# Patient Record
Sex: MALE | Race: BLACK OR AFRICAN AMERICAN
[De-identification: names, ages, dates, MRNs, and addresses within clinical notes are randomized per-mention and may not be internally consistent; named-entity substitution may affect disease eponyms.]

---

## 2021-11-23 ENCOUNTER — HOSPITAL ENCOUNTER (INPATIENT)
Dept: HOSPITAL 54 - ER | Age: 71
LOS: 2 days | Discharge: NURSING, REHAB OR LONG-TERM CARE | DRG: 202 | End: 2021-11-25
Attending: INTERNAL MEDICINE | Admitting: INTERNAL MEDICINE
Payer: MEDICARE

## 2021-11-23 VITALS — HEIGHT: 72 IN | WEIGHT: 163.31 LBS | BODY MASS INDEX: 22.12 KG/M2

## 2021-11-23 VITALS — DIASTOLIC BLOOD PRESSURE: 88 MMHG | SYSTOLIC BLOOD PRESSURE: 152 MMHG

## 2021-11-23 VITALS — DIASTOLIC BLOOD PRESSURE: 98 MMHG | SYSTOLIC BLOOD PRESSURE: 157 MMHG

## 2021-11-23 DIAGNOSIS — Z20.822: ICD-10-CM

## 2021-11-23 DIAGNOSIS — J40: Primary | ICD-10-CM

## 2021-11-23 DIAGNOSIS — D64.9: ICD-10-CM

## 2021-11-23 DIAGNOSIS — F20.9: ICD-10-CM

## 2021-11-23 DIAGNOSIS — E11.9: ICD-10-CM

## 2021-11-23 DIAGNOSIS — N17.0: ICD-10-CM

## 2021-11-23 DIAGNOSIS — E85.9: ICD-10-CM

## 2021-11-23 DIAGNOSIS — I10: ICD-10-CM

## 2021-11-23 DIAGNOSIS — I50.32: ICD-10-CM

## 2021-11-23 DIAGNOSIS — I11.0: ICD-10-CM

## 2021-11-23 DIAGNOSIS — Z87.891: ICD-10-CM

## 2021-11-23 DIAGNOSIS — I25.10: ICD-10-CM

## 2021-11-23 LAB
BASOPHILS # BLD AUTO: 0 K/UL (ref 0–0.2)
BASOPHILS NFR BLD AUTO: 0.6 % (ref 0–2)
BUN SERPL-MCNC: 22 MG/DL (ref 7–18)
CALCIUM SERPL-MCNC: 9.3 MG/DL (ref 8.5–10.1)
CHLORIDE SERPL-SCNC: 105 MMOL/L (ref 98–107)
CO2 SERPL-SCNC: 32 MMOL/L (ref 21–32)
CREAT SERPL-MCNC: 1.4 MG/DL (ref 0.6–1.3)
EOSINOPHIL NFR BLD AUTO: 2.2 % (ref 0–6)
GLUCOSE SERPL-MCNC: 116 MG/DL (ref 74–106)
HCT VFR BLD AUTO: 40 % (ref 39–51)
HGB BLD-MCNC: 12.8 G/DL (ref 13.5–17.5)
LYMPHOCYTES NFR BLD AUTO: 1.7 K/UL (ref 0.8–4.8)
LYMPHOCYTES NFR BLD AUTO: 38.6 % (ref 20–44)
MCHC RBC AUTO-ENTMCNC: 32 G/DL (ref 31–36)
MCV RBC AUTO: 82 FL (ref 80–96)
MONOCYTES NFR BLD AUTO: 0.3 K/UL (ref 0.1–1.3)
MONOCYTES NFR BLD AUTO: 7 % (ref 2–12)
NEUTROPHILS # BLD AUTO: 2.3 K/UL (ref 1.8–8.9)
NEUTROPHILS NFR BLD AUTO: 51.6 % (ref 43–81)
PLATELET # BLD AUTO: 218 K/UL (ref 150–450)
POTASSIUM SERPL-SCNC: 4 MMOL/L (ref 3.5–5.1)
RBC # BLD AUTO: 4.84 MIL/UL (ref 4.5–6)
SODIUM SERPL-SCNC: 141 MMOL/L (ref 136–145)
WBC NRBC COR # BLD AUTO: 4.4 K/UL (ref 4.3–11)

## 2021-11-23 PROCEDURE — C9803 HOPD COVID-19 SPEC COLLECT: HCPCS

## 2021-11-23 PROCEDURE — G0378 HOSPITAL OBSERVATION PER HR: HCPCS

## 2021-11-23 RX ADMIN — ZOLPIDEM TARTRATE PRN MG: 5 TABLET, FILM COATED ORAL at 21:42

## 2021-11-23 NOTE — NUR
TELE RN NOTE





RECEIVED PT A/O X2 ON ROOM AIR WITH NO SIGNS OF RESP/ DISTRESS. PT HAS A RIGHT AC 18G SL 
FLUSHED PATENT AND INTACT.  PT PLACED ON TELE MONITOR, BELONGINGS DOCUMENTED IN THE CHART. 
PT. ABLE TO USE URINAL, VOIDED 150 ML. VITAL SIGNS AS FOLLOWS, TEMP; 98.1, RESP RATE; 20, 02 
SAT 97%, HR 72, /98.   PT ORIENTED TO ROOM AND HOW TO USE CALL LIGHT.  SAFETY MEASURES 
IN PLACE, BED IN LOWEST LOCKED POSITION. CALL LIGHT WITHIN REACH. SIDE RAILS UP X 2.

## 2021-11-23 NOTE — NUR
TELE RN CLOSING NOTES



PT. IS RESTING IN BED WITH HOB IN SEMI FOWLERS POSITION ON ROOM AIR WITH SOME COUGHING.  PT 
HAS A R AC 18 G SL.  PT. IS REQUESTING TO SEE THE DOCTOR, PT IS ASKING FOR SLEEPING PILLS, 
WILL ENDORSE TO NEXT SHIFT.  PT. IS AMBULATORY AND ABLE TO MAKE NEEDS KNOWN.  SAFETY 
MEASURES IN PLACE, BED IN LOWEST LOCKED POSITION WITH CALL LIGHT IN REACH.  SIDE RAILS UP X 
2.

## 2021-11-23 NOTE — NUR
RN OPENING NOTES

RECEIVED PT A/O X3, PT ON ROOM AIR WITH NO SIGNS OF RESPIRATORY DISTRESS AND O2 SAT OF 98%. 
PT IS ABLE TO VERBALIZE NEEDS, HAS NO COMPLAINS OF CHEST PAIN AT THIS TIME. PT HAS IV ACCESS 
ON RIGHT AC 18G SL FLUSHED PATENT AND INTACT.  PT PLACED ON TELE MONITOR, NSR WITH 
OCCASIONAL PVCs AND HR OF 84. SKIN IS INTACT. VITAL SIGNS AS FOLLOWS, TEMP 98.6, RESP RATE; 
20, /88. ALL SAFETY MEASURES ARE IMPLEMENTED, CALL LIGHT IS WITHIN REACH, BED IS AT 
LOWEST POSITION WITH WHEELS LOCKED IN PLACE, SIDE RAILS UP X2. WILL CONTINUE TO MONITOR FOR 
ANY CHANGES IN PATIENT HEALTH STATUS.

## 2021-11-23 NOTE — NUR
Facility called 911; c/o CP, on and off for 4 weeks; high blood pressure. PT 
AAOX3, RR EVEN & UNLABORED. DENIES DIZZINESS, N/V, WEAKNESS AT THIS TIME. PT 
SEEN & EVAL'D BY DR. PORTER. PLACED ON CARDIAC MONITOR, SR. O2 SAT 96%RA. WILL 
CONT TO MONITOR.

## 2021-11-24 VITALS — DIASTOLIC BLOOD PRESSURE: 90 MMHG | SYSTOLIC BLOOD PRESSURE: 158 MMHG

## 2021-11-24 VITALS — SYSTOLIC BLOOD PRESSURE: 139 MMHG | DIASTOLIC BLOOD PRESSURE: 79 MMHG

## 2021-11-24 VITALS — SYSTOLIC BLOOD PRESSURE: 109 MMHG | DIASTOLIC BLOOD PRESSURE: 85 MMHG

## 2021-11-24 VITALS — DIASTOLIC BLOOD PRESSURE: 87 MMHG | SYSTOLIC BLOOD PRESSURE: 147 MMHG

## 2021-11-24 VITALS — SYSTOLIC BLOOD PRESSURE: 142 MMHG | DIASTOLIC BLOOD PRESSURE: 79 MMHG

## 2021-11-24 VITALS — SYSTOLIC BLOOD PRESSURE: 146 MMHG | DIASTOLIC BLOOD PRESSURE: 90 MMHG

## 2021-11-24 RX ADMIN — Medication PRN MG: at 14:15

## 2021-11-24 RX ADMIN — ZOLPIDEM TARTRATE PRN MG: 5 TABLET, FILM COATED ORAL at 21:10

## 2021-11-24 RX ADMIN — PANTOPRAZOLE SODIUM SCH MG: 40 TABLET, DELAYED RELEASE ORAL at 08:08

## 2021-11-24 RX ADMIN — HALOPERIDOL SCH MG: 5 TABLET ORAL at 21:09

## 2021-11-24 RX ADMIN — FUROSEMIDE SCH MG: 10 INJECTION, SOLUTION INTRAMUSCULAR; INTRAVENOUS at 10:48

## 2021-11-24 RX ADMIN — ASPIRIN 81 MG SCH MG: 81 TABLET ORAL at 08:08

## 2021-11-24 RX ADMIN — Medication PRN MG: at 16:20

## 2021-11-24 RX ADMIN — FUROSEMIDE SCH MG: 10 INJECTION, SOLUTION INTRAMUSCULAR; INTRAVENOUS at 14:55

## 2021-11-24 NOTE — NUR
RN CLOSING NOTES

WILL ENDORSE PT TO DAY SHIT NURSE IN STABLE CONDITIONS, A/O X3, PT ON ROOM AIR WITH NO SIGNS 
OF RESPIRATORY DISTRESS AND O2 SAT %. PT IS ABLE TO VERBALIZE NEEDS, HAS NO COMPLAINS 
OF CHEST PAIN AT THIS TIME. PT HAS IV ACCESS ON RIGHT AC 18G SL FLUSHED PATENT AND INTACT.  
PT PLACED ON TELE MONITOR, SINUS BRADYCARDIA HR 53 WITH OCCASIONAL PVCs. PT REMAINS 
ASYMPTOMATIC. SKIN IS INTACT.  ALL SAFETY MEASURES ARE IMPLEMENTED, CALL LIGHT IS WITHIN 
REACH, BED IS AT LOWEST POSITION WITH WHEELS LOCKED IN PLACE, SIDE RAILS UP X2. WILL ENDORSE 
TO DAY SHIFT NURSE FOR CLAUDIO.

## 2021-11-24 NOTE — NUR
Report given to MICHELLE Hill for CLAUDIO. Transported back to rm 101-1 via [facility], remains in stable 
condition at this time.

## 2021-11-24 NOTE — NUR
TELE RN OPENING NOTE 



RECEIVED PT A/O X3, PT ON ROOM AIR WITH NO SIGNS OF RESPIRATORY DISTRESS AND O2 SAT OF 98%. 
PT IS ABLE TO VERBALIZE NEEDS, HAS NO COMPLAINS OF CHEST PAIN AT THIS TIME. PT HAS IV ACCESS 
ON RIGHT AC 18G SL PATENT AND INTACT. SKIN INTACT PT PLACED ON TELE MONITOR. ALL SAFETY 
MEASURES ARE IMPLEMENTED, CALL LIGHT IS WITHIN REACH, BED IS AT LOWEST POSITION WITH WHEELS 
LOCKED IN PLACE, SIDE RAILS UP X2.

## 2021-11-24 NOTE — NUR
TELE RN CLOSING NOTE 





 PATIENT IN STABLE CONDITIONS, A/O X3, PT ON ROOM AIR WITH NO SIGNS OF RESPIRATORY DISTRESS 
AND O2 SAT %. PT IS ABLE TO VERBALIZE NEEDS, HAS NO COMPLAINS OF CHEST PAIN AT THIS 
TIME. PT HAS IV ACCESS ON RIGHT AC 18G SL FLUSHED PATENT AND INTACT.  PT PLACED ON TELE 
MONITOR, NSR WITH HR OF 77 BPM. SKIN IS INTACT.  ALL SAFETY MEASURES ARE IMPLEMENTED, CALL 
LIGHT IS WITHIN REACH, BED IS AT LOWEST POSITION WITH WHEELS LOCKED IN PLACE, SIDE RAILS UP 
X2. WILL ENDORSE TO NIGHT NURSE FOR CLAUDIO.

## 2021-11-24 NOTE — NUR
TELE-1/RN



PT REFUSING BLOOD DRAW. RISK AND BENEFITS EXPLAINED. PT STATES HE "JUST WANTS TO REST". WILL 
CONTINUE TO MONITOR.

## 2021-11-25 VITALS — SYSTOLIC BLOOD PRESSURE: 93 MMHG | DIASTOLIC BLOOD PRESSURE: 62 MMHG

## 2021-11-25 VITALS — SYSTOLIC BLOOD PRESSURE: 100 MMHG | DIASTOLIC BLOOD PRESSURE: 76 MMHG

## 2021-11-25 VITALS — DIASTOLIC BLOOD PRESSURE: 104 MMHG | SYSTOLIC BLOOD PRESSURE: 160 MMHG

## 2021-11-25 VITALS — DIASTOLIC BLOOD PRESSURE: 78 MMHG | SYSTOLIC BLOOD PRESSURE: 126 MMHG

## 2021-11-25 RX ADMIN — ASPIRIN 81 MG SCH MG: 81 TABLET ORAL at 08:12

## 2021-11-25 RX ADMIN — HALOPERIDOL SCH MG: 5 TABLET ORAL at 08:12

## 2021-11-25 RX ADMIN — PANTOPRAZOLE SODIUM SCH MG: 40 TABLET, DELAYED RELEASE ORAL at 08:12

## 2021-11-25 NOTE — NUR
RN NOTES



 2 EMT'S FROM [facility] CAME TO PICK-UP PT  TO BE TRANSFERRED TO [facility]  BUT PT'S BP 
/104MMHG. THEY STATED THAT THEY CAN NOT TAKE PT BECAUSE [facility] WILL NOT 
DEFINITELY ACCEPT PT. THEY LEFT UNIT AND SAID TO CALL THEM ONCE BP GOES DOWN AND STABLE. DR HOLGUIN MADE AWARE WITH ORDER TO GIVE HYDRALAZINE 10 MG IV X1 DOSE AND WAS ADMINISTERED. 
WILL RECHECK BP.

## 2021-11-25 NOTE — NUR
RN NOTES



RECHECKED X2 AND /91 /95 POST 15/20 MINUTES POST ADMINISTRATION OF HYDRALAZINE 
10MG IV X1 DOSE. CALLED [facility]  AMBULANCE SERVICE AND SPOKED TO DARYL AND STATED THAT THEIR 
AMBULANCE WILL COME SOON.

## 2021-11-25 NOTE — NUR
RN DISCHARGED NOTES



PT DISCHARGED TO [facility] IN STABLE CONDITION. CALLED AND REPORT GIVEN TO MICHELLE HICKMAN EARLIER AND PT  WILL GO TO RM 26A. PT IS A/O X2-3 WITH SOME CONFUSION AND FORGETFULNESS 
NOTED. ABLE TO MAKE NEEDS KNOWN. PT MADE AWARE  OF BEING TRANSFERRED/DC'D TO [facility] AND 
VERBALIZED UNDERSTANDING. V/S STABLE NOW, LATEST BP TAKEN BY EMT'S /75MMHG, HR 90.  
ALL BELONGING ACCOUNTED FOR AND SIGNED FORM. IV ACCESS ON RAC G#18 REMOVED, NO ACTIVE 
BLEEDING NOTED AND DRY PRESSURE DRESSING APPLIED AT SITE. PT LEFT UNIT  AT 1845 VIA GURNEY 
ACCOMPANIED BY 2 EMTS FROM [facility]ANCE SERV[facility]. MD AND CHARGE NURSE AWARE OF DISCHARGE.

## 2021-11-25 NOTE — NUR
RN NOTES



PT AGREED TO HAVE TELE-MONITOR PLACED WITH CURRENT READING OF NSR WITH HR ON THE 70-80'S, NO 
C/O CARDIAC DISTRESS VOICED. WILL CONTINUE TO MONITOR

## 2021-11-25 NOTE — NUR
RN CLOSING NOTE



PATIENT STILL HAVE HIS BLANKET COVER. PATIENT ABLE TO ANSWER QUESTIONS. NOT IN ANY APPARENT 
DISTRESS. ON ROOM AIR AT THIS TIME. PATIENT REFUSED 0400 VITAL SIGNS AND STILL REFUSES TO 
HAVE TELE MONITOR ON AT THIS TIME. WAS ABLE PERFORM HYGIENE ACTIVITIES ON PATIENT. EDUCATED 
PATIENT ON IMPORTANCE OF TELE MONITORING, VS, AND HYGIENE. SAFETY MEASURES IN PLACE: BED 
LOCKED AND IN LOWEST POSITION, CALL LIGHT WITHIN REACH, SIDE RAILS UP. WILL ENDORSE TO DAY 
SHIFT NURSE FOR CLAUDIO.

## 2021-11-25 NOTE — NUR
RN TRANSFER NOTE



PATIENT TRANSFERRED FROM [facility], RECEIVED FROM AMIRAH MARTINEZ. PATIENT REFUSING TO BE ASSESSED, HAVE 
LINENS CHANGED, AND REFUSING TO HAVE TELE MONITOR ON. PATIENT UNDER THE BLANKET AND HAVE HIS 
FACE COVERED. DOES NOT WANT TO BE SEEN, HE STATES " I'M SLEEPING MAN, LEAVE ME ALONE". 
STATES "NO" TO HAVE VITAL SIGNS AND HAVE TELE BOX ATTACHED. WILL TRY AGAIN AT A LATER TIME. 

-------------------------------------------------------------------------------

Addendum: 11/25/21 at 0250 by ANGEL LÓPEZ RN

-------------------------------------------------------------------------------

CHARGE NURSE VIRGINIE ALEXANDER

## 2023-02-23 ENCOUNTER — HOSPITAL ENCOUNTER (INPATIENT)
Dept: HOSPITAL 54 - ER | Age: 73
LOS: 14 days | Discharge: NURSING, REHAB OR LONG-TERM CARE | DRG: 885 | End: 2023-03-09
Attending: NURSE PRACTITIONER | Admitting: PSYCHIATRY & NEUROLOGY
Payer: MEDICARE

## 2023-02-23 VITALS — WEIGHT: 160 LBS | HEIGHT: 66 IN | BODY MASS INDEX: 25.71 KG/M2

## 2023-02-23 DIAGNOSIS — I12.9: ICD-10-CM

## 2023-02-23 DIAGNOSIS — Z79.82: ICD-10-CM

## 2023-02-23 DIAGNOSIS — Z79.899: ICD-10-CM

## 2023-02-23 DIAGNOSIS — E11.22: ICD-10-CM

## 2023-02-23 DIAGNOSIS — F32.A: ICD-10-CM

## 2023-02-23 DIAGNOSIS — Z73.6: ICD-10-CM

## 2023-02-23 DIAGNOSIS — N17.0: ICD-10-CM

## 2023-02-23 DIAGNOSIS — F20.9: Primary | ICD-10-CM

## 2023-02-23 DIAGNOSIS — R41.9: ICD-10-CM

## 2023-02-23 DIAGNOSIS — F41.9: ICD-10-CM

## 2023-02-23 DIAGNOSIS — N18.9: ICD-10-CM

## 2023-02-23 DIAGNOSIS — F29: ICD-10-CM

## 2023-02-23 DIAGNOSIS — E87.6: ICD-10-CM

## 2023-02-23 DIAGNOSIS — F19.90: ICD-10-CM

## 2023-02-23 DIAGNOSIS — Z87.891: ICD-10-CM

## 2023-02-23 LAB
ALBUMIN SERPL BCP-MCNC: 3.4 G/DL (ref 3.4–5)
ALP SERPL-CCNC: 90 U/L (ref 46–116)
ALT SERPL W P-5'-P-CCNC: 19 U/L (ref 12–78)
APAP SERPL-MCNC: < 10 UG/ML (ref 10–30)
AST SERPL W P-5'-P-CCNC: 15 U/L (ref 15–37)
BASOPHILS # BLD AUTO: 0.1 K/UL (ref 0–0.2)
BASOPHILS NFR BLD AUTO: 0.9 % (ref 0–2)
BILIRUB DIRECT SERPL-MCNC: 0.1 MG/DL (ref 0–0.2)
BILIRUB SERPL-MCNC: 0.2 MG/DL (ref 0.2–1)
BILIRUB UR QL STRIP: NEGATIVE
BUN SERPL-MCNC: 19 MG/DL (ref 7–18)
CALCIUM SERPL-MCNC: 9.3 MG/DL (ref 8.5–10.1)
CHLORIDE SERPL-SCNC: 104 MMOL/L (ref 98–107)
CO2 SERPL-SCNC: 27 MMOL/L (ref 21–32)
COLOR UR: YELLOW
CREAT SERPL-MCNC: 1.8 MG/DL (ref 0.6–1.3)
EOSINOPHIL NFR BLD AUTO: 2.6 % (ref 0–6)
ETHANOL SERPL-MCNC: < 3 MG/DL (ref 0–0)
GLUCOSE SERPL-MCNC: 104 MG/DL (ref 74–106)
GLUCOSE UR STRIP-MCNC: NEGATIVE MG/DL
HCT VFR BLD AUTO: 44 % (ref 39–51)
HGB BLD-MCNC: 14.1 G/DL (ref 13.5–17.5)
LEUKOCYTE ESTERASE UR QL STRIP: NEGATIVE
LYMPHOCYTES NFR BLD AUTO: 2.2 K/UL (ref 0.8–4.8)
LYMPHOCYTES NFR BLD AUTO: 39.9 % (ref 20–44)
MCHC RBC AUTO-ENTMCNC: 32 G/DL (ref 31–36)
MCV RBC AUTO: 83 FL (ref 80–96)
MONOCYTES NFR BLD AUTO: 0.6 K/UL (ref 0.1–1.3)
MONOCYTES NFR BLD AUTO: 10 % (ref 2–12)
NEUTROPHILS # BLD AUTO: 2.6 K/UL (ref 1.8–8.9)
NEUTROPHILS NFR BLD AUTO: 46.6 % (ref 43–81)
NITRITE UR QL STRIP: NEGATIVE
PH UR STRIP: 5.5 [PH] (ref 5–8)
PLATELET # BLD AUTO: 200 K/UL (ref 150–450)
POTASSIUM SERPL-SCNC: 3.5 MMOL/L (ref 3.5–5.1)
PROT SERPL-MCNC: 7.1 G/DL (ref 6.4–8.2)
PROT UR QL STRIP: NEGATIVE MG/DL
RBC # BLD AUTO: 5.28 MIL/UL (ref 4.5–6)
SODIUM SERPL-SCNC: 136 MMOL/L (ref 136–145)
UROBILINOGEN UR STRIP-MCNC: 0.2 EU/DL
WBC NRBC COR # BLD AUTO: 5.6 K/UL (ref 4.3–11)

## 2023-02-23 PROCEDURE — C9803 HOPD COVID-19 SPEC COLLECT: HCPCS

## 2023-02-23 PROCEDURE — G0480 DRUG TEST DEF 1-7 CLASSES: HCPCS

## 2023-02-24 VITALS — DIASTOLIC BLOOD PRESSURE: 132 MMHG | SYSTOLIC BLOOD PRESSURE: 202 MMHG

## 2023-02-24 VITALS — DIASTOLIC BLOOD PRESSURE: 95 MMHG | SYSTOLIC BLOOD PRESSURE: 181 MMHG

## 2023-02-24 RX ADMIN — BENZTROPINE MESYLATE SCH MG: 1 TABLET ORAL at 20:50

## 2023-02-24 RX ADMIN — BENZTROPINE MESYLATE SCH MG: 1 TABLET ORAL at 12:52

## 2023-02-24 RX ADMIN — DORZOLAMIDE HYDROCHLORIDE AND TIMOLOL MALEATE SCH DROP: 20; 5 SOLUTION OPHTHALMIC at 16:27

## 2023-02-24 RX ADMIN — LATANOPROST SCH DROP: 50 SOLUTION OPHTHALMIC at 21:34

## 2023-02-24 RX ADMIN — HALOPERIDOL SCH MG: 5 TABLET ORAL at 20:50

## 2023-02-24 RX ADMIN — HALOPERIDOL SCH MG: 5 TABLET ORAL at 12:52

## 2023-02-24 RX ADMIN — OLANZAPINE SCH MG: 5 TABLET, FILM COATED ORAL at 20:50

## 2023-02-25 VITALS — SYSTOLIC BLOOD PRESSURE: 132 MMHG | DIASTOLIC BLOOD PRESSURE: 80 MMHG

## 2023-02-25 VITALS — DIASTOLIC BLOOD PRESSURE: 89 MMHG | SYSTOLIC BLOOD PRESSURE: 132 MMHG

## 2023-02-25 VITALS — DIASTOLIC BLOOD PRESSURE: 99 MMHG | SYSTOLIC BLOOD PRESSURE: 119 MMHG

## 2023-02-25 RX ADMIN — OLANZAPINE SCH MG: 5 TABLET, FILM COATED ORAL at 22:31

## 2023-02-25 RX ADMIN — ASPIRIN 81 MG SCH MG: 81 TABLET ORAL at 08:32

## 2023-02-25 RX ADMIN — DORZOLAMIDE HYDROCHLORIDE AND TIMOLOL MALEATE SCH DROP: 20; 5 SOLUTION OPHTHALMIC at 08:32

## 2023-02-25 RX ADMIN — DORZOLAMIDE HYDROCHLORIDE AND TIMOLOL MALEATE SCH DROP: 20; 5 SOLUTION OPHTHALMIC at 16:02

## 2023-02-25 RX ADMIN — HALOPERIDOL SCH MG: 5 TABLET ORAL at 08:30

## 2023-02-25 RX ADMIN — TEMAZEPAM PRN MG: 7.5 CAPSULE ORAL at 22:32

## 2023-02-25 RX ADMIN — LATANOPROST SCH DROP: 50 SOLUTION OPHTHALMIC at 22:34

## 2023-02-25 RX ADMIN — Medication SCH MG: at 08:32

## 2023-02-25 RX ADMIN — BENZTROPINE MESYLATE SCH MG: 1 TABLET ORAL at 22:31

## 2023-02-25 RX ADMIN — HALOPERIDOL SCH MG: 5 TABLET ORAL at 22:36

## 2023-02-25 RX ADMIN — BENZTROPINE MESYLATE SCH MG: 1 TABLET ORAL at 08:30

## 2023-02-26 VITALS — SYSTOLIC BLOOD PRESSURE: 164 MMHG | DIASTOLIC BLOOD PRESSURE: 99 MMHG

## 2023-02-26 VITALS — DIASTOLIC BLOOD PRESSURE: 78 MMHG | SYSTOLIC BLOOD PRESSURE: 142 MMHG

## 2023-02-26 VITALS — DIASTOLIC BLOOD PRESSURE: 74 MMHG | SYSTOLIC BLOOD PRESSURE: 145 MMHG

## 2023-02-26 VITALS — SYSTOLIC BLOOD PRESSURE: 157 MMHG | DIASTOLIC BLOOD PRESSURE: 81 MMHG

## 2023-02-26 RX ADMIN — LATANOPROST SCH DROP: 50 SOLUTION OPHTHALMIC at 21:25

## 2023-02-26 RX ADMIN — BENZTROPINE MESYLATE SCH MG: 1 TABLET ORAL at 09:15

## 2023-02-26 RX ADMIN — BENZTROPINE MESYLATE SCH MG: 1 TABLET ORAL at 21:21

## 2023-02-26 RX ADMIN — ASPIRIN 81 MG SCH MG: 81 TABLET ORAL at 09:15

## 2023-02-26 RX ADMIN — LISINOPRIL SCH MG: 5 TABLET ORAL at 09:16

## 2023-02-26 RX ADMIN — DORZOLAMIDE HYDROCHLORIDE AND TIMOLOL MALEATE SCH DROP: 20; 5 SOLUTION OPHTHALMIC at 09:13

## 2023-02-26 RX ADMIN — HALOPERIDOL SCH MG: 5 TABLET ORAL at 21:21

## 2023-02-26 RX ADMIN — OLANZAPINE SCH MG: 5 TABLET, FILM COATED ORAL at 21:21

## 2023-02-26 RX ADMIN — HALOPERIDOL SCH MG: 5 TABLET ORAL at 09:15

## 2023-02-26 RX ADMIN — DORZOLAMIDE HYDROCHLORIDE AND TIMOLOL MALEATE SCH DROP: 20; 5 SOLUTION OPHTHALMIC at 16:23

## 2023-02-26 RX ADMIN — Medication SCH MG: at 09:15

## 2023-02-27 VITALS — DIASTOLIC BLOOD PRESSURE: 97 MMHG | SYSTOLIC BLOOD PRESSURE: 158 MMHG

## 2023-02-27 VITALS — DIASTOLIC BLOOD PRESSURE: 72 MMHG | SYSTOLIC BLOOD PRESSURE: 140 MMHG

## 2023-02-27 VITALS — SYSTOLIC BLOOD PRESSURE: 141 MMHG | DIASTOLIC BLOOD PRESSURE: 93 MMHG

## 2023-02-27 RX ADMIN — HALOPERIDOL SCH MG: 5 TABLET ORAL at 08:31

## 2023-02-27 RX ADMIN — Medication SCH MG: at 08:32

## 2023-02-27 RX ADMIN — BENZTROPINE MESYLATE SCH MG: 1 TABLET ORAL at 08:32

## 2023-02-27 RX ADMIN — LATANOPROST SCH DROP: 50 SOLUTION OPHTHALMIC at 22:06

## 2023-02-27 RX ADMIN — DORZOLAMIDE HYDROCHLORIDE AND TIMOLOL MALEATE SCH DROP: 20; 5 SOLUTION OPHTHALMIC at 16:56

## 2023-02-27 RX ADMIN — LISINOPRIL SCH MG: 5 TABLET ORAL at 08:32

## 2023-02-27 RX ADMIN — DORZOLAMIDE HYDROCHLORIDE AND TIMOLOL MALEATE SCH DROP: 20; 5 SOLUTION OPHTHALMIC at 09:12

## 2023-02-27 RX ADMIN — BENZTROPINE MESYLATE SCH MG: 1 TABLET ORAL at 21:16

## 2023-02-27 RX ADMIN — OLANZAPINE SCH MG: 5 TABLET, FILM COATED ORAL at 21:16

## 2023-02-27 RX ADMIN — ASPIRIN 81 MG SCH MG: 81 TABLET ORAL at 08:31

## 2023-02-27 RX ADMIN — HALOPERIDOL SCH MG: 5 TABLET ORAL at 21:16

## 2023-02-28 VITALS — SYSTOLIC BLOOD PRESSURE: 188 MMHG | DIASTOLIC BLOOD PRESSURE: 115 MMHG

## 2023-02-28 VITALS — SYSTOLIC BLOOD PRESSURE: 162 MMHG | DIASTOLIC BLOOD PRESSURE: 110 MMHG

## 2023-02-28 LAB
BUN SERPL-MCNC: 19 MG/DL (ref 7–18)
CALCIUM SERPL-MCNC: 9.7 MG/DL (ref 8.5–10.1)
CHLORIDE SERPL-SCNC: 104 MMOL/L (ref 98–107)
CO2 SERPL-SCNC: 30 MMOL/L (ref 21–32)
CREAT SERPL-MCNC: 1.5 MG/DL (ref 0.6–1.3)
GLUCOSE SERPL-MCNC: 108 MG/DL (ref 74–106)
POTASSIUM SERPL-SCNC: 3.7 MMOL/L (ref 3.5–5.1)
SODIUM SERPL-SCNC: 140 MMOL/L (ref 136–145)

## 2023-02-28 RX ADMIN — BENZTROPINE MESYLATE SCH MG: 1 TABLET ORAL at 20:10

## 2023-02-28 RX ADMIN — LISINOPRIL SCH MG: 5 TABLET ORAL at 08:38

## 2023-02-28 RX ADMIN — ASPIRIN 81 MG SCH MG: 81 TABLET ORAL at 08:37

## 2023-02-28 RX ADMIN — OLANZAPINE SCH MG: 5 TABLET, FILM COATED ORAL at 20:09

## 2023-02-28 RX ADMIN — DORZOLAMIDE HYDROCHLORIDE AND TIMOLOL MALEATE SCH DROP: 20; 5 SOLUTION OPHTHALMIC at 17:00

## 2023-02-28 RX ADMIN — HALOPERIDOL SCH MG: 5 TABLET ORAL at 20:10

## 2023-02-28 RX ADMIN — HALOPERIDOL SCH MG: 5 TABLET ORAL at 08:38

## 2023-02-28 RX ADMIN — TEMAZEPAM PRN MG: 7.5 CAPSULE ORAL at 20:11

## 2023-02-28 RX ADMIN — Medication SCH MG: at 08:37

## 2023-02-28 RX ADMIN — AMLODIPINE BESYLATE SCH MG: 5 TABLET ORAL at 14:12

## 2023-02-28 RX ADMIN — DORZOLAMIDE HYDROCHLORIDE AND TIMOLOL MALEATE SCH DROP: 20; 5 SOLUTION OPHTHALMIC at 08:38

## 2023-02-28 RX ADMIN — BENZTROPINE MESYLATE SCH MG: 1 TABLET ORAL at 08:37

## 2023-02-28 RX ADMIN — LATANOPROST SCH DROP: 50 SOLUTION OPHTHALMIC at 22:29

## 2023-03-01 VITALS — DIASTOLIC BLOOD PRESSURE: 79 MMHG | SYSTOLIC BLOOD PRESSURE: 154 MMHG

## 2023-03-01 VITALS — SYSTOLIC BLOOD PRESSURE: 155 MMHG | DIASTOLIC BLOOD PRESSURE: 90 MMHG

## 2023-03-01 VITALS — DIASTOLIC BLOOD PRESSURE: 90 MMHG | SYSTOLIC BLOOD PRESSURE: 102 MMHG

## 2023-03-01 RX ADMIN — BENZTROPINE MESYLATE SCH MG: 1 TABLET ORAL at 09:00

## 2023-03-01 RX ADMIN — DORZOLAMIDE HYDROCHLORIDE AND TIMOLOL MALEATE SCH DROP: 20; 5 SOLUTION OPHTHALMIC at 17:40

## 2023-03-01 RX ADMIN — ASPIRIN 81 MG SCH MG: 81 TABLET ORAL at 10:32

## 2023-03-01 RX ADMIN — LATANOPROST SCH DROP: 50 SOLUTION OPHTHALMIC at 21:13

## 2023-03-01 RX ADMIN — AMLODIPINE BESYLATE SCH MG: 5 TABLET ORAL at 10:32

## 2023-03-01 RX ADMIN — BENZTROPINE MESYLATE SCH MG: 1 TABLET ORAL at 21:15

## 2023-03-01 RX ADMIN — HALOPERIDOL SCH MG: 5 TABLET ORAL at 21:15

## 2023-03-01 RX ADMIN — DORZOLAMIDE HYDROCHLORIDE AND TIMOLOL MALEATE SCH DROP: 20; 5 SOLUTION OPHTHALMIC at 09:00

## 2023-03-01 RX ADMIN — OLANZAPINE SCH MG: 5 TABLET, FILM COATED ORAL at 21:13

## 2023-03-01 RX ADMIN — LISINOPRIL SCH MG: 10 TABLET ORAL at 10:33

## 2023-03-01 RX ADMIN — HALOPERIDOL SCH MG: 5 TABLET ORAL at 10:32

## 2023-03-01 RX ADMIN — Medication SCH MG: at 10:31

## 2023-03-02 VITALS — DIASTOLIC BLOOD PRESSURE: 92 MMHG | SYSTOLIC BLOOD PRESSURE: 148 MMHG

## 2023-03-02 VITALS — SYSTOLIC BLOOD PRESSURE: 127 MMHG | DIASTOLIC BLOOD PRESSURE: 91 MMHG

## 2023-03-02 VITALS — DIASTOLIC BLOOD PRESSURE: 80 MMHG | SYSTOLIC BLOOD PRESSURE: 162 MMHG

## 2023-03-02 LAB
BUN SERPL-MCNC: 35 MG/DL (ref 7–18)
CALCIUM SERPL-MCNC: 9.5 MG/DL (ref 8.5–10.1)
CHLORIDE SERPL-SCNC: 105 MMOL/L (ref 98–107)
CO2 SERPL-SCNC: 29 MMOL/L (ref 21–32)
CREAT SERPL-MCNC: 2 MG/DL (ref 0.6–1.3)
GLUCOSE SERPL-MCNC: 182 MG/DL (ref 74–106)
POTASSIUM SERPL-SCNC: 3.7 MMOL/L (ref 3.5–5.1)
SODIUM SERPL-SCNC: 141 MMOL/L (ref 136–145)

## 2023-03-02 RX ADMIN — Medication SCH MG: at 08:50

## 2023-03-02 RX ADMIN — LISINOPRIL SCH MG: 10 TABLET ORAL at 08:52

## 2023-03-02 RX ADMIN — MEMANTINE HYDROCHLORIDE SCH MG: 5 TABLET ORAL at 16:34

## 2023-03-02 RX ADMIN — HALOPERIDOL SCH MG: 5 TABLET ORAL at 08:51

## 2023-03-02 RX ADMIN — DORZOLAMIDE HYDROCHLORIDE AND TIMOLOL MALEATE SCH DROP: 20; 5 SOLUTION OPHTHALMIC at 16:38

## 2023-03-02 RX ADMIN — OLANZAPINE SCH MG: 5 TABLET, FILM COATED ORAL at 20:13

## 2023-03-02 RX ADMIN — MEMANTINE HYDROCHLORIDE SCH MG: 5 TABLET ORAL at 08:50

## 2023-03-02 RX ADMIN — ASPIRIN 81 MG SCH MG: 81 TABLET ORAL at 08:50

## 2023-03-02 RX ADMIN — BENZTROPINE MESYLATE SCH MG: 1 TABLET ORAL at 20:12

## 2023-03-02 RX ADMIN — AMLODIPINE BESYLATE SCH MG: 5 TABLET ORAL at 08:52

## 2023-03-02 RX ADMIN — DORZOLAMIDE HYDROCHLORIDE AND TIMOLOL MALEATE SCH DROP: 20; 5 SOLUTION OPHTHALMIC at 08:56

## 2023-03-02 RX ADMIN — HALOPERIDOL SCH MG: 5 TABLET ORAL at 20:13

## 2023-03-02 RX ADMIN — BENZTROPINE MESYLATE SCH MG: 1 TABLET ORAL at 08:50

## 2023-03-02 RX ADMIN — LATANOPROST SCH DROP: 50 SOLUTION OPHTHALMIC at 22:19

## 2023-03-03 VITALS — DIASTOLIC BLOOD PRESSURE: 73 MMHG | SYSTOLIC BLOOD PRESSURE: 142 MMHG

## 2023-03-03 VITALS — SYSTOLIC BLOOD PRESSURE: 143 MMHG | DIASTOLIC BLOOD PRESSURE: 75 MMHG

## 2023-03-03 VITALS — SYSTOLIC BLOOD PRESSURE: 137 MMHG | DIASTOLIC BLOOD PRESSURE: 87 MMHG

## 2023-03-03 RX ADMIN — BENZTROPINE MESYLATE SCH MG: 1 TABLET ORAL at 09:37

## 2023-03-03 RX ADMIN — HALOPERIDOL SCH MG: 5 TABLET ORAL at 09:37

## 2023-03-03 RX ADMIN — TEMAZEPAM PRN MG: 7.5 CAPSULE ORAL at 21:07

## 2023-03-03 RX ADMIN — LATANOPROST SCH DROP: 50 SOLUTION OPHTHALMIC at 23:01

## 2023-03-03 RX ADMIN — HALOPERIDOL SCH MG: 5 TABLET ORAL at 21:01

## 2023-03-03 RX ADMIN — LISINOPRIL SCH MG: 10 TABLET ORAL at 09:38

## 2023-03-03 RX ADMIN — OLANZAPINE SCH MG: 5 TABLET, FILM COATED ORAL at 21:01

## 2023-03-03 RX ADMIN — BENZTROPINE MESYLATE SCH MG: 1 TABLET ORAL at 21:01

## 2023-03-03 RX ADMIN — Medication SCH MG: at 09:37

## 2023-03-03 RX ADMIN — MEMANTINE HYDROCHLORIDE SCH MG: 5 TABLET ORAL at 16:31

## 2023-03-03 RX ADMIN — ASPIRIN 81 MG SCH MG: 81 TABLET ORAL at 09:37

## 2023-03-03 RX ADMIN — DORZOLAMIDE HYDROCHLORIDE AND TIMOLOL MALEATE SCH DROP: 20; 5 SOLUTION OPHTHALMIC at 09:47

## 2023-03-03 RX ADMIN — DORZOLAMIDE HYDROCHLORIDE AND TIMOLOL MALEATE SCH DROP: 20; 5 SOLUTION OPHTHALMIC at 16:32

## 2023-03-03 RX ADMIN — MEMANTINE HYDROCHLORIDE SCH MG: 5 TABLET ORAL at 09:37

## 2023-03-03 RX ADMIN — AMLODIPINE BESYLATE SCH MG: 5 TABLET ORAL at 09:46

## 2023-03-04 VITALS — SYSTOLIC BLOOD PRESSURE: 163 MMHG | DIASTOLIC BLOOD PRESSURE: 85 MMHG

## 2023-03-04 VITALS — DIASTOLIC BLOOD PRESSURE: 69 MMHG | SYSTOLIC BLOOD PRESSURE: 149 MMHG

## 2023-03-04 VITALS — SYSTOLIC BLOOD PRESSURE: 141 MMHG | DIASTOLIC BLOOD PRESSURE: 78 MMHG

## 2023-03-04 RX ADMIN — Medication SCH MG: at 08:48

## 2023-03-04 RX ADMIN — HALOPERIDOL SCH MG: 5 TABLET ORAL at 08:49

## 2023-03-04 RX ADMIN — OLANZAPINE SCH MG: 5 TABLET, FILM COATED ORAL at 21:44

## 2023-03-04 RX ADMIN — MEMANTINE HYDROCHLORIDE SCH MG: 5 TABLET ORAL at 16:12

## 2023-03-04 RX ADMIN — BENZTROPINE MESYLATE SCH MG: 1 TABLET ORAL at 21:44

## 2023-03-04 RX ADMIN — ASPIRIN 81 MG SCH MG: 81 TABLET ORAL at 08:48

## 2023-03-04 RX ADMIN — LISINOPRIL SCH MG: 10 TABLET ORAL at 08:48

## 2023-03-04 RX ADMIN — DORZOLAMIDE HYDROCHLORIDE AND TIMOLOL MALEATE SCH DROP: 20; 5 SOLUTION OPHTHALMIC at 08:48

## 2023-03-04 RX ADMIN — BENZTROPINE MESYLATE SCH MG: 1 TABLET ORAL at 08:48

## 2023-03-04 RX ADMIN — DORZOLAMIDE HYDROCHLORIDE AND TIMOLOL MALEATE SCH DROP: 20; 5 SOLUTION OPHTHALMIC at 16:12

## 2023-03-04 RX ADMIN — MEMANTINE HYDROCHLORIDE SCH MG: 5 TABLET ORAL at 08:49

## 2023-03-04 RX ADMIN — AMLODIPINE BESYLATE SCH MG: 5 TABLET ORAL at 08:49

## 2023-03-04 RX ADMIN — LATANOPROST SCH DROP: 50 SOLUTION OPHTHALMIC at 21:45

## 2023-03-04 RX ADMIN — HALOPERIDOL SCH MG: 5 TABLET ORAL at 21:44

## 2023-03-05 VITALS — SYSTOLIC BLOOD PRESSURE: 142 MMHG | DIASTOLIC BLOOD PRESSURE: 83 MMHG

## 2023-03-05 VITALS — DIASTOLIC BLOOD PRESSURE: 76 MMHG | SYSTOLIC BLOOD PRESSURE: 112 MMHG

## 2023-03-05 VITALS — SYSTOLIC BLOOD PRESSURE: 125 MMHG | DIASTOLIC BLOOD PRESSURE: 84 MMHG

## 2023-03-05 VITALS — DIASTOLIC BLOOD PRESSURE: 84 MMHG | SYSTOLIC BLOOD PRESSURE: 125 MMHG

## 2023-03-05 RX ADMIN — TEMAZEPAM PRN MG: 7.5 CAPSULE ORAL at 21:40

## 2023-03-05 RX ADMIN — Medication SCH MG: at 08:28

## 2023-03-05 RX ADMIN — HALOPERIDOL SCH MG: 5 TABLET ORAL at 08:28

## 2023-03-05 RX ADMIN — MEMANTINE HYDROCHLORIDE SCH MG: 5 TABLET ORAL at 16:01

## 2023-03-05 RX ADMIN — BENZTROPINE MESYLATE SCH MG: 1 TABLET ORAL at 21:41

## 2023-03-05 RX ADMIN — LATANOPROST SCH DROP: 50 SOLUTION OPHTHALMIC at 21:40

## 2023-03-05 RX ADMIN — ASPIRIN 81 MG SCH MG: 81 TABLET ORAL at 08:28

## 2023-03-05 RX ADMIN — BENZTROPINE MESYLATE SCH MG: 1 TABLET ORAL at 08:28

## 2023-03-05 RX ADMIN — LISINOPRIL SCH MG: 10 TABLET ORAL at 08:29

## 2023-03-05 RX ADMIN — DORZOLAMIDE HYDROCHLORIDE AND TIMOLOL MALEATE SCH DROP: 20; 5 SOLUTION OPHTHALMIC at 08:30

## 2023-03-05 RX ADMIN — DORZOLAMIDE HYDROCHLORIDE AND TIMOLOL MALEATE SCH DROP: 20; 5 SOLUTION OPHTHALMIC at 16:07

## 2023-03-05 RX ADMIN — OLANZAPINE SCH MG: 5 TABLET, FILM COATED ORAL at 21:39

## 2023-03-05 RX ADMIN — AMLODIPINE BESYLATE SCH MG: 5 TABLET ORAL at 08:29

## 2023-03-05 RX ADMIN — MEMANTINE HYDROCHLORIDE SCH MG: 5 TABLET ORAL at 08:28

## 2023-03-05 RX ADMIN — HALOPERIDOL SCH MG: 5 TABLET ORAL at 21:40

## 2023-03-06 VITALS — DIASTOLIC BLOOD PRESSURE: 67 MMHG | SYSTOLIC BLOOD PRESSURE: 127 MMHG

## 2023-03-06 VITALS — DIASTOLIC BLOOD PRESSURE: 74 MMHG | SYSTOLIC BLOOD PRESSURE: 130 MMHG

## 2023-03-06 VITALS — DIASTOLIC BLOOD PRESSURE: 79 MMHG | SYSTOLIC BLOOD PRESSURE: 140 MMHG

## 2023-03-06 RX ADMIN — BENZTROPINE MESYLATE SCH MG: 1 TABLET ORAL at 20:22

## 2023-03-06 RX ADMIN — LATANOPROST SCH DROP: 50 SOLUTION OPHTHALMIC at 22:03

## 2023-03-06 RX ADMIN — BENZTROPINE MESYLATE SCH MG: 1 TABLET ORAL at 09:23

## 2023-03-06 RX ADMIN — TEMAZEPAM PRN MG: 7.5 CAPSULE ORAL at 22:03

## 2023-03-06 RX ADMIN — MEMANTINE HYDROCHLORIDE SCH MG: 5 TABLET ORAL at 16:41

## 2023-03-06 RX ADMIN — AMLODIPINE BESYLATE SCH MG: 5 TABLET ORAL at 08:49

## 2023-03-06 RX ADMIN — LISINOPRIL SCH MG: 10 TABLET ORAL at 08:49

## 2023-03-06 RX ADMIN — OLANZAPINE SCH MG: 5 TABLET, FILM COATED ORAL at 20:22

## 2023-03-06 RX ADMIN — ASPIRIN 81 MG SCH MG: 81 TABLET ORAL at 08:49

## 2023-03-06 RX ADMIN — MEMANTINE HYDROCHLORIDE SCH MG: 5 TABLET ORAL at 08:49

## 2023-03-06 RX ADMIN — DORZOLAMIDE HYDROCHLORIDE AND TIMOLOL MALEATE SCH DROP: 20; 5 SOLUTION OPHTHALMIC at 09:23

## 2023-03-06 RX ADMIN — Medication SCH MG: at 08:49

## 2023-03-06 RX ADMIN — HALOPERIDOL SCH MG: 5 TABLET ORAL at 20:23

## 2023-03-06 RX ADMIN — HALOPERIDOL SCH MG: 5 TABLET ORAL at 08:48

## 2023-03-06 RX ADMIN — DORZOLAMIDE HYDROCHLORIDE AND TIMOLOL MALEATE SCH DROP: 20; 5 SOLUTION OPHTHALMIC at 16:41

## 2023-03-07 VITALS — SYSTOLIC BLOOD PRESSURE: 156 MMHG | DIASTOLIC BLOOD PRESSURE: 80 MMHG

## 2023-03-07 VITALS — SYSTOLIC BLOOD PRESSURE: 165 MMHG | DIASTOLIC BLOOD PRESSURE: 80 MMHG

## 2023-03-07 VITALS — SYSTOLIC BLOOD PRESSURE: 130 MMHG | DIASTOLIC BLOOD PRESSURE: 75 MMHG

## 2023-03-07 LAB
BUN SERPL-MCNC: 29 MG/DL (ref 7–18)
CALCIUM SERPL-MCNC: 9 MG/DL (ref 8.5–10.1)
CHLORIDE SERPL-SCNC: 108 MMOL/L (ref 98–107)
CO2 SERPL-SCNC: 32 MMOL/L (ref 21–32)
CREAT SERPL-MCNC: 1.5 MG/DL (ref 0.6–1.3)
GLUCOSE SERPL-MCNC: 93 MG/DL (ref 74–106)
POTASSIUM SERPL-SCNC: 3.2 MMOL/L (ref 3.5–5.1)
SODIUM SERPL-SCNC: 145 MMOL/L (ref 136–145)

## 2023-03-07 RX ADMIN — LATANOPROST SCH DROP: 50 SOLUTION OPHTHALMIC at 21:21

## 2023-03-07 RX ADMIN — DORZOLAMIDE HYDROCHLORIDE AND TIMOLOL MALEATE SCH DROP: 20; 5 SOLUTION OPHTHALMIC at 16:55

## 2023-03-07 RX ADMIN — MEMANTINE HYDROCHLORIDE SCH MG: 5 TABLET ORAL at 08:39

## 2023-03-07 RX ADMIN — DORZOLAMIDE HYDROCHLORIDE AND TIMOLOL MALEATE SCH DROP: 20; 5 SOLUTION OPHTHALMIC at 09:15

## 2023-03-07 RX ADMIN — LISINOPRIL SCH MG: 10 TABLET ORAL at 08:42

## 2023-03-07 RX ADMIN — MEMANTINE HYDROCHLORIDE SCH MG: 5 TABLET ORAL at 16:55

## 2023-03-07 RX ADMIN — AMLODIPINE BESYLATE SCH MG: 5 TABLET ORAL at 08:40

## 2023-03-07 RX ADMIN — HALOPERIDOL SCH MG: 5 TABLET ORAL at 21:20

## 2023-03-07 RX ADMIN — OLANZAPINE SCH MG: 5 TABLET, FILM COATED ORAL at 21:21

## 2023-03-07 RX ADMIN — BENZTROPINE MESYLATE SCH MG: 1 TABLET ORAL at 21:20

## 2023-03-07 RX ADMIN — HALOPERIDOL SCH MG: 5 TABLET ORAL at 08:40

## 2023-03-07 RX ADMIN — BENZTROPINE MESYLATE SCH MG: 1 TABLET ORAL at 08:39

## 2023-03-07 RX ADMIN — ASPIRIN 81 MG SCH MG: 81 TABLET ORAL at 08:39

## 2023-03-07 RX ADMIN — Medication SCH MG: at 08:40

## 2023-03-08 VITALS — DIASTOLIC BLOOD PRESSURE: 85 MMHG | SYSTOLIC BLOOD PRESSURE: 148 MMHG

## 2023-03-08 VITALS — SYSTOLIC BLOOD PRESSURE: 130 MMHG | DIASTOLIC BLOOD PRESSURE: 79 MMHG

## 2023-03-08 VITALS — DIASTOLIC BLOOD PRESSURE: 77 MMHG | SYSTOLIC BLOOD PRESSURE: 138 MMHG

## 2023-03-08 RX ADMIN — BENZTROPINE MESYLATE SCH MG: 1 TABLET ORAL at 09:25

## 2023-03-08 RX ADMIN — AMLODIPINE BESYLATE SCH MG: 5 TABLET ORAL at 09:26

## 2023-03-08 RX ADMIN — HALOPERIDOL SCH MG: 5 TABLET ORAL at 20:23

## 2023-03-08 RX ADMIN — LISINOPRIL SCH MG: 10 TABLET ORAL at 09:25

## 2023-03-08 RX ADMIN — DORZOLAMIDE HYDROCHLORIDE AND TIMOLOL MALEATE SCH DROP: 20; 5 SOLUTION OPHTHALMIC at 16:58

## 2023-03-08 RX ADMIN — ASPIRIN 81 MG SCH MG: 81 TABLET ORAL at 09:24

## 2023-03-08 RX ADMIN — Medication SCH MG: at 09:26

## 2023-03-08 RX ADMIN — BENZTROPINE MESYLATE SCH MG: 1 TABLET ORAL at 20:22

## 2023-03-08 RX ADMIN — LATANOPROST SCH DROP: 50 SOLUTION OPHTHALMIC at 22:02

## 2023-03-08 RX ADMIN — HALOPERIDOL SCH MG: 5 TABLET ORAL at 09:25

## 2023-03-08 RX ADMIN — MEMANTINE HYDROCHLORIDE SCH MG: 5 TABLET ORAL at 09:25

## 2023-03-08 RX ADMIN — DORZOLAMIDE HYDROCHLORIDE AND TIMOLOL MALEATE SCH DROP: 20; 5 SOLUTION OPHTHALMIC at 09:30

## 2023-03-08 RX ADMIN — OLANZAPINE SCH MG: 5 TABLET, FILM COATED ORAL at 20:23

## 2023-03-08 RX ADMIN — MEMANTINE HYDROCHLORIDE SCH MG: 5 TABLET ORAL at 16:58

## 2023-03-09 VITALS — SYSTOLIC BLOOD PRESSURE: 146 MMHG | DIASTOLIC BLOOD PRESSURE: 82 MMHG

## 2023-03-09 VITALS — DIASTOLIC BLOOD PRESSURE: 82 MMHG | SYSTOLIC BLOOD PRESSURE: 146 MMHG

## 2023-03-09 RX ADMIN — AMLODIPINE BESYLATE SCH MG: 5 TABLET ORAL at 08:43

## 2023-03-09 RX ADMIN — BENZTROPINE MESYLATE SCH MG: 1 TABLET ORAL at 08:41

## 2023-03-09 RX ADMIN — MEMANTINE HYDROCHLORIDE SCH MG: 5 TABLET ORAL at 08:41

## 2023-03-09 RX ADMIN — Medication SCH MG: at 08:42

## 2023-03-09 RX ADMIN — LISINOPRIL SCH MG: 10 TABLET ORAL at 08:42

## 2023-03-09 RX ADMIN — ASPIRIN 81 MG SCH MG: 81 TABLET ORAL at 08:43

## 2023-03-09 RX ADMIN — DORZOLAMIDE HYDROCHLORIDE AND TIMOLOL MALEATE SCH DROP: 20; 5 SOLUTION OPHTHALMIC at 08:49

## 2023-03-09 RX ADMIN — HALOPERIDOL SCH MG: 5 TABLET ORAL at 08:43

## 2023-08-04 ENCOUNTER — HOSPITAL ENCOUNTER (INPATIENT)
Dept: HOSPITAL 54 - ER | Age: 73
LOS: 11 days | Discharge: NURSING, REHAB OR LONG-TERM CARE | DRG: 885 | End: 2023-08-15
Attending: NURSE PRACTITIONER | Admitting: PSYCHIATRY & NEUROLOGY
Payer: MEDICARE

## 2023-08-04 VITALS — OXYGEN SATURATION: 97 % | DIASTOLIC BLOOD PRESSURE: 93 MMHG | SYSTOLIC BLOOD PRESSURE: 142 MMHG | TEMPERATURE: 97.6 F

## 2023-08-04 VITALS — HEIGHT: 68 IN | BODY MASS INDEX: 26.22 KG/M2 | WEIGHT: 173 LBS

## 2023-08-04 DIAGNOSIS — F20.0: Primary | ICD-10-CM

## 2023-08-04 DIAGNOSIS — E44.1: ICD-10-CM

## 2023-08-04 DIAGNOSIS — Z87.891: ICD-10-CM

## 2023-08-04 DIAGNOSIS — F29: ICD-10-CM

## 2023-08-04 DIAGNOSIS — F39: ICD-10-CM

## 2023-08-04 DIAGNOSIS — Z79.82: ICD-10-CM

## 2023-08-04 DIAGNOSIS — N18.9: ICD-10-CM

## 2023-08-04 DIAGNOSIS — Z20.822: ICD-10-CM

## 2023-08-04 DIAGNOSIS — I12.9: ICD-10-CM

## 2023-08-04 DIAGNOSIS — E11.22: ICD-10-CM

## 2023-08-04 DIAGNOSIS — F19.10: ICD-10-CM

## 2023-08-04 DIAGNOSIS — Z73.6: ICD-10-CM

## 2023-08-04 DIAGNOSIS — D64.9: ICD-10-CM

## 2023-08-04 DIAGNOSIS — F03.90: ICD-10-CM

## 2023-08-04 DIAGNOSIS — E88.09: ICD-10-CM

## 2023-08-04 LAB
ALBUMIN SERPL BCP-MCNC: 3 G/DL (ref 3.4–5)
ALP SERPL-CCNC: 58 U/L (ref 46–116)
ALT SERPL W P-5'-P-CCNC: 20 U/L (ref 12–78)
AMPHETAMINES UR QL: NEGATIVE
APAP SERPL-MCNC: < 10 UG/ML (ref 10–30)
APPEARANCE UR: CLEAR
AST SERPL W P-5'-P-CCNC: 11 U/L (ref 15–37)
BARBITURATES UR QL SCN: NEGATIVE
BASOPHILS # BLD AUTO: 0.1 K/UL (ref 0–0.2)
BASOPHILS NFR BLD AUTO: 1.2 % (ref 0–2)
BENZODIAZ UR QL SCN: NEGATIVE
BILIRUB DIRECT SERPL-MCNC: 0.1 MG/DL (ref 0–0.2)
BILIRUB SERPL-MCNC: 0.2 MG/DL (ref 0.2–1)
BILIRUB UR QL STRIP: NEGATIVE
BUN SERPL-MCNC: 21 MG/DL (ref 7–18)
CALCIUM SERPL-MCNC: 9.5 MG/DL (ref 8.5–10.1)
CANNABINOIDS UR QL SCN: NEGATIVE
CHLORIDE SERPL-SCNC: 106 MMOL/L (ref 98–107)
CO2 SERPL-SCNC: 28 MMOL/L (ref 21–32)
COCAINE UR QL SCN: NEGATIVE
COLOR UR: YELLOW
CREAT SERPL-MCNC: 1.6 MG/DL (ref 0.6–1.3)
EOSINOPHIL # BLD AUTO: 0.3 K/UL (ref 0–0.7)
EOSINOPHIL NFR BLD AUTO: 3.9 % (ref 0–6)
ERYTHROCYTE [DISTWIDTH] IN BLOOD BY AUTOMATED COUNT: 14.2 % (ref 11.5–15)
ETHANOL SERPL-MCNC: < 3 MG/DL (ref 0–10)
GLUCOSE SERPL-MCNC: 99 MG/DL (ref 74–106)
GLUCOSE UR STRIP-MCNC: NEGATIVE MG/DL
HCT VFR BLD AUTO: 39 % (ref 39–51)
HGB BLD-MCNC: 12.8 G/DL (ref 13.5–17.5)
HGB UR QL STRIP: NEGATIVE ERY/UL
KETONES UR STRIP-MCNC: NEGATIVE MG/DL
LEUKOCYTE ESTERASE UR QL STRIP: NEGATIVE
LYMPHOCYTES NFR BLD AUTO: 2.7 K/UL (ref 0.8–4.8)
LYMPHOCYTES NFR BLD AUTO: 38.1 % (ref 20–44)
MCH RBC QN AUTO: 27 PG (ref 26–33)
MCHC RBC AUTO-ENTMCNC: 33 G/DL (ref 31–36)
MCV RBC AUTO: 83 FL (ref 80–96)
MONOCYTES NFR BLD AUTO: 0.7 K/UL (ref 0.1–1.3)
MONOCYTES NFR BLD AUTO: 9.5 % (ref 2–12)
NEUTROPHILS # BLD AUTO: 3.4 K/UL (ref 1.8–8.9)
NEUTROPHILS NFR BLD AUTO: 47.3 % (ref 43–81)
NITRITE UR QL STRIP: NEGATIVE
OPIATES UR QL SCN: NEGATIVE
PCP UR QL SCN: NEGATIVE
PH UR STRIP: 6 [PH] (ref 5–8)
PLATELET # BLD AUTO: 221 K/UL (ref 150–450)
POTASSIUM SERPL-SCNC: 3.7 MMOL/L (ref 3.5–5.1)
PROT SERPL-MCNC: 6.4 G/DL (ref 6.4–8.2)
PROT UR QL STRIP: NEGATIVE MG/DL
RBC # BLD AUTO: 4.73 MIL/UL (ref 4.5–6)
SALICYLATES SERPL-MCNC: < 2.3 MG/DL (ref 2.8–20)
SODIUM SERPL-SCNC: 140 MMOL/L (ref 136–145)
SP GR UR STRIP: <1.005 (ref 1–1.03)
UROBILINOGEN UR STRIP-MCNC: 0.2 EU/DL
WBC NRBC COR # BLD AUTO: 7.2 K/UL (ref 4.3–11)

## 2023-08-04 PROCEDURE — G0480 DRUG TEST DEF 1-7 CLASSES: HCPCS

## 2023-08-05 VITALS — OXYGEN SATURATION: 97 % | SYSTOLIC BLOOD PRESSURE: 150 MMHG | TEMPERATURE: 98.3 F | DIASTOLIC BLOOD PRESSURE: 92 MMHG

## 2023-08-05 VITALS — DIASTOLIC BLOOD PRESSURE: 84 MMHG | OXYGEN SATURATION: 98 % | TEMPERATURE: 97.7 F | SYSTOLIC BLOOD PRESSURE: 145 MMHG

## 2023-08-05 VITALS — TEMPERATURE: 97.8 F | OXYGEN SATURATION: 96 % | DIASTOLIC BLOOD PRESSURE: 105 MMHG | SYSTOLIC BLOOD PRESSURE: 147 MMHG

## 2023-08-05 VITALS — TEMPERATURE: 97.6 F | SYSTOLIC BLOOD PRESSURE: 142 MMHG | DIASTOLIC BLOOD PRESSURE: 93 MMHG

## 2023-08-05 RX ADMIN — HALOPERIDOL SCH MG: 5 TABLET ORAL at 16:31

## 2023-08-05 RX ADMIN — BENZTROPINE MESYLATE SCH MG: 1 TABLET ORAL at 16:31

## 2023-08-05 RX ADMIN — Medication SCH MG: at 17:09

## 2023-08-05 RX ADMIN — MEMANTINE HYDROCHLORIDE SCH MG: 5 TABLET ORAL at 16:31

## 2023-08-05 RX ADMIN — ASPIRIN 81 MG SCH MG: 81 TABLET ORAL at 17:09

## 2023-08-05 RX ADMIN — BENZTROPINE MESYLATE SCH MG: 1 TABLET ORAL at 12:35

## 2023-08-05 RX ADMIN — LISINOPRIL SCH MG: 20 TABLET ORAL at 15:21

## 2023-08-05 RX ADMIN — OLANZAPINE SCH MG: 10 TABLET ORAL at 21:33

## 2023-08-05 RX ADMIN — TEMAZEPAM PRN MG: 7.5 CAPSULE ORAL at 23:31

## 2023-08-05 RX ADMIN — AMLODIPINE BESYLATE SCH MG: 10 TABLET ORAL at 15:20

## 2023-08-05 RX ADMIN — HALOPERIDOL SCH MG: 5 TABLET ORAL at 12:35

## 2023-08-06 VITALS — OXYGEN SATURATION: 95 % | DIASTOLIC BLOOD PRESSURE: 70 MMHG | SYSTOLIC BLOOD PRESSURE: 125 MMHG | TEMPERATURE: 98 F

## 2023-08-06 VITALS — SYSTOLIC BLOOD PRESSURE: 145 MMHG | OXYGEN SATURATION: 94 % | DIASTOLIC BLOOD PRESSURE: 107 MMHG | TEMPERATURE: 97.6 F

## 2023-08-06 LAB
BASOPHILS # BLD AUTO: 0.1 K/UL (ref 0–0.2)
BASOPHILS NFR BLD AUTO: 0.5 % (ref 0–2)
BUN SERPL-MCNC: 17 MG/DL (ref 7–18)
CALCIUM SERPL-MCNC: 10.8 MG/DL (ref 8.5–10.1)
CHLORIDE SERPL-SCNC: 104 MMOL/L (ref 98–107)
CHOLEST SERPL-MCNC: 186 MG/DL (ref ?–200)
CO2 SERPL-SCNC: 24 MMOL/L (ref 21–32)
CREAT SERPL-MCNC: 1.2 MG/DL (ref 0.6–1.3)
EOSINOPHIL # BLD AUTO: 0 K/UL (ref 0–0.7)
EOSINOPHIL NFR BLD AUTO: 0.2 % (ref 0–6)
ERYTHROCYTE [DISTWIDTH] IN BLOOD BY AUTOMATED COUNT: 14.3 % (ref 11.5–15)
GLUCOSE SERPL-MCNC: 125 MG/DL (ref 74–106)
HCT VFR BLD AUTO: 49 % (ref 39–51)
HDLC SERPL-MCNC: 58 MG/DL (ref 40–60)
HGB BLD-MCNC: 15.7 G/DL (ref 13.5–17.5)
LDLC SERPL DIRECT ASSAY-MCNC: 98 MG/DL (ref 0–99)
LYMPHOCYTES NFR BLD AUTO: 1.4 K/UL (ref 0.8–4.8)
LYMPHOCYTES NFR BLD AUTO: 11 % (ref 20–44)
MCH RBC QN AUTO: 27 PG (ref 26–33)
MCHC RBC AUTO-ENTMCNC: 32 G/DL (ref 31–36)
MCV RBC AUTO: 84 FL (ref 80–96)
MONOCYTES NFR BLD AUTO: 0.9 K/UL (ref 0.1–1.3)
MONOCYTES NFR BLD AUTO: 7.1 % (ref 2–12)
NEUTROPHILS # BLD AUTO: 10.2 K/UL (ref 1.8–8.9)
NEUTROPHILS NFR BLD AUTO: 81.2 % (ref 43–81)
PLATELET # BLD AUTO: 233 K/UL (ref 150–450)
POTASSIUM SERPL-SCNC: 4 MMOL/L (ref 3.5–5.1)
RBC # BLD AUTO: 5.86 MIL/UL (ref 4.5–6)
SODIUM SERPL-SCNC: 139 MMOL/L (ref 136–145)
TRIGL SERPL-MCNC: 96 MG/DL (ref 30–150)
WBC NRBC COR # BLD AUTO: 12.5 K/UL (ref 4.3–11)

## 2023-08-06 RX ADMIN — OLANZAPINE SCH MG: 10 TABLET ORAL at 21:29

## 2023-08-06 RX ADMIN — BENZTROPINE MESYLATE SCH MG: 1 TABLET ORAL at 17:11

## 2023-08-06 RX ADMIN — BENZTROPINE MESYLATE SCH MG: 1 TABLET ORAL at 08:31

## 2023-08-06 RX ADMIN — MEMANTINE HYDROCHLORIDE SCH MG: 5 TABLET ORAL at 17:11

## 2023-08-06 RX ADMIN — MEMANTINE HYDROCHLORIDE SCH MG: 5 TABLET ORAL at 08:31

## 2023-08-06 RX ADMIN — HALOPERIDOL SCH MG: 5 TABLET ORAL at 17:11

## 2023-08-06 RX ADMIN — HALOPERIDOL SCH MG: 5 TABLET ORAL at 08:31

## 2023-08-06 RX ADMIN — LISINOPRIL SCH MG: 20 TABLET ORAL at 08:31

## 2023-08-06 RX ADMIN — ASPIRIN 81 MG SCH MG: 81 TABLET ORAL at 17:11

## 2023-08-06 RX ADMIN — Medication SCH MG: at 17:11

## 2023-08-06 RX ADMIN — AMLODIPINE BESYLATE SCH MG: 10 TABLET ORAL at 08:31

## 2023-08-07 VITALS — DIASTOLIC BLOOD PRESSURE: 66 MMHG | SYSTOLIC BLOOD PRESSURE: 122 MMHG | OXYGEN SATURATION: 96 % | TEMPERATURE: 97.7 F

## 2023-08-07 VITALS — DIASTOLIC BLOOD PRESSURE: 63 MMHG | TEMPERATURE: 98.9 F | SYSTOLIC BLOOD PRESSURE: 136 MMHG | OXYGEN SATURATION: 95 %

## 2023-08-07 VITALS — DIASTOLIC BLOOD PRESSURE: 61 MMHG | SYSTOLIC BLOOD PRESSURE: 112 MMHG | OXYGEN SATURATION: 94 % | TEMPERATURE: 98.1 F

## 2023-08-07 RX ADMIN — DORZOLAMIDE HYDROCHLORIDE AND TIMOLOL MALEATE SCH ML: 20; 5 SOLUTION OPHTHALMIC at 17:19

## 2023-08-07 RX ADMIN — DORZOLAMIDE HYDROCHLORIDE AND TIMOLOL MALEATE SCH ML: 20; 5 SOLUTION OPHTHALMIC at 09:46

## 2023-08-07 RX ADMIN — OLANZAPINE SCH MG: 10 TABLET ORAL at 21:10

## 2023-08-07 RX ADMIN — BENZTROPINE MESYLATE SCH MG: 1 TABLET ORAL at 17:19

## 2023-08-07 RX ADMIN — HALOPERIDOL SCH MG: 5 TABLET ORAL at 13:20

## 2023-08-07 RX ADMIN — LISINOPRIL SCH MG: 20 TABLET ORAL at 08:48

## 2023-08-07 RX ADMIN — BENZTROPINE MESYLATE SCH MG: 1 TABLET ORAL at 08:28

## 2023-08-07 RX ADMIN — Medication SCH MG: at 17:19

## 2023-08-07 RX ADMIN — AMLODIPINE BESYLATE SCH MG: 10 TABLET ORAL at 08:29

## 2023-08-07 RX ADMIN — MEMANTINE HYDROCHLORIDE SCH MG: 5 TABLET ORAL at 08:28

## 2023-08-07 RX ADMIN — MEMANTINE HYDROCHLORIDE SCH MG: 5 TABLET ORAL at 17:19

## 2023-08-07 RX ADMIN — HALOPERIDOL SCH MG: 5 TABLET ORAL at 17:19

## 2023-08-07 RX ADMIN — ASPIRIN 81 MG SCH MG: 81 TABLET ORAL at 17:19

## 2023-08-07 RX ADMIN — HALOPERIDOL SCH MG: 5 TABLET ORAL at 08:28

## 2023-08-08 VITALS — SYSTOLIC BLOOD PRESSURE: 154 MMHG | DIASTOLIC BLOOD PRESSURE: 101 MMHG | OXYGEN SATURATION: 97 % | TEMPERATURE: 98.1 F

## 2023-08-08 VITALS — SYSTOLIC BLOOD PRESSURE: 120 MMHG | DIASTOLIC BLOOD PRESSURE: 71 MMHG | OXYGEN SATURATION: 97 % | TEMPERATURE: 97.9 F

## 2023-08-08 VITALS — DIASTOLIC BLOOD PRESSURE: 69 MMHG | OXYGEN SATURATION: 98 % | SYSTOLIC BLOOD PRESSURE: 110 MMHG | TEMPERATURE: 97.7 F

## 2023-08-08 RX ADMIN — MEMANTINE HYDROCHLORIDE SCH MG: 5 TABLET ORAL at 09:20

## 2023-08-08 RX ADMIN — AMLODIPINE BESYLATE SCH MG: 10 TABLET ORAL at 09:21

## 2023-08-08 RX ADMIN — Medication SCH MG: at 18:15

## 2023-08-08 RX ADMIN — DORZOLAMIDE HYDROCHLORIDE AND TIMOLOL MALEATE SCH ML: 20; 5 SOLUTION OPHTHALMIC at 16:36

## 2023-08-08 RX ADMIN — HALOPERIDOL SCH MG: 5 TABLET ORAL at 16:35

## 2023-08-08 RX ADMIN — BENZTROPINE MESYLATE SCH MG: 1 TABLET ORAL at 09:20

## 2023-08-08 RX ADMIN — HALOPERIDOL SCH MG: 5 TABLET ORAL at 09:20

## 2023-08-08 RX ADMIN — MEMANTINE HYDROCHLORIDE SCH MG: 5 TABLET ORAL at 16:35

## 2023-08-08 RX ADMIN — LISINOPRIL SCH MG: 20 TABLET ORAL at 09:21

## 2023-08-08 RX ADMIN — HALOPERIDOL SCH MG: 5 TABLET ORAL at 12:40

## 2023-08-08 RX ADMIN — OLANZAPINE SCH MG: 10 TABLET ORAL at 21:11

## 2023-08-08 RX ADMIN — BENZTROPINE MESYLATE SCH MG: 1 TABLET ORAL at 16:35

## 2023-08-08 RX ADMIN — DORZOLAMIDE HYDROCHLORIDE AND TIMOLOL MALEATE SCH ML: 20; 5 SOLUTION OPHTHALMIC at 09:22

## 2023-08-08 RX ADMIN — ASPIRIN 81 MG SCH MG: 81 TABLET ORAL at 18:15

## 2023-08-09 VITALS — TEMPERATURE: 98.2 F | OXYGEN SATURATION: 95 % | SYSTOLIC BLOOD PRESSURE: 99 MMHG | DIASTOLIC BLOOD PRESSURE: 58 MMHG

## 2023-08-09 VITALS — SYSTOLIC BLOOD PRESSURE: 117 MMHG | DIASTOLIC BLOOD PRESSURE: 66 MMHG | OXYGEN SATURATION: 99 % | TEMPERATURE: 98.1 F

## 2023-08-09 VITALS — SYSTOLIC BLOOD PRESSURE: 113 MMHG | TEMPERATURE: 98.1 F | DIASTOLIC BLOOD PRESSURE: 60 MMHG | OXYGEN SATURATION: 93 %

## 2023-08-09 RX ADMIN — BENZTROPINE MESYLATE SCH MG: 1 TABLET ORAL at 08:19

## 2023-08-09 RX ADMIN — Medication SCH MG: at 17:06

## 2023-08-09 RX ADMIN — LISINOPRIL SCH MG: 20 TABLET ORAL at 09:55

## 2023-08-09 RX ADMIN — OLANZAPINE SCH MG: 10 TABLET ORAL at 22:10

## 2023-08-09 RX ADMIN — BENZTROPINE MESYLATE SCH MG: 1 TABLET ORAL at 17:06

## 2023-08-09 RX ADMIN — DORZOLAMIDE HYDROCHLORIDE AND TIMOLOL MALEATE SCH ML: 20; 5 SOLUTION OPHTHALMIC at 17:05

## 2023-08-09 RX ADMIN — TEMAZEPAM PRN MG: 7.5 CAPSULE ORAL at 22:26

## 2023-08-09 RX ADMIN — MEMANTINE HYDROCHLORIDE SCH MG: 5 TABLET ORAL at 08:19

## 2023-08-09 RX ADMIN — ASPIRIN 81 MG SCH MG: 81 TABLET ORAL at 17:06

## 2023-08-09 RX ADMIN — MEMANTINE HYDROCHLORIDE SCH MG: 5 TABLET ORAL at 17:06

## 2023-08-09 RX ADMIN — HALOPERIDOL SCH MG: 5 TABLET ORAL at 17:06

## 2023-08-09 RX ADMIN — AMLODIPINE BESYLATE SCH MG: 10 TABLET ORAL at 08:19

## 2023-08-09 RX ADMIN — HALOPERIDOL SCH MG: 5 TABLET ORAL at 08:19

## 2023-08-09 RX ADMIN — DORZOLAMIDE HYDROCHLORIDE AND TIMOLOL MALEATE SCH ML: 20; 5 SOLUTION OPHTHALMIC at 08:18

## 2023-08-09 RX ADMIN — HALOPERIDOL SCH MG: 5 TABLET ORAL at 12:24

## 2023-08-10 VITALS — TEMPERATURE: 98.1 F | DIASTOLIC BLOOD PRESSURE: 69 MMHG | SYSTOLIC BLOOD PRESSURE: 123 MMHG | OXYGEN SATURATION: 97 %

## 2023-08-10 VITALS — TEMPERATURE: 97.7 F | SYSTOLIC BLOOD PRESSURE: 118 MMHG | DIASTOLIC BLOOD PRESSURE: 71 MMHG | OXYGEN SATURATION: 99 %

## 2023-08-10 VITALS — SYSTOLIC BLOOD PRESSURE: 167 MMHG | TEMPERATURE: 97.6 F | DIASTOLIC BLOOD PRESSURE: 95 MMHG | OXYGEN SATURATION: 98 %

## 2023-08-10 RX ADMIN — MEMANTINE HYDROCHLORIDE SCH MG: 5 TABLET ORAL at 16:51

## 2023-08-10 RX ADMIN — LISINOPRIL SCH MG: 20 TABLET ORAL at 08:15

## 2023-08-10 RX ADMIN — Medication SCH MG: at 17:11

## 2023-08-10 RX ADMIN — HALOPERIDOL SCH MG: 5 TABLET ORAL at 12:13

## 2023-08-10 RX ADMIN — DORZOLAMIDE HYDROCHLORIDE AND TIMOLOL MALEATE SCH DROP: 20; 5 SOLUTION OPHTHALMIC at 16:53

## 2023-08-10 RX ADMIN — MEMANTINE HYDROCHLORIDE SCH MG: 5 TABLET ORAL at 08:15

## 2023-08-10 RX ADMIN — DORZOLAMIDE HYDROCHLORIDE AND TIMOLOL MALEATE SCH DROP: 20; 5 SOLUTION OPHTHALMIC at 08:30

## 2023-08-10 RX ADMIN — OLANZAPINE SCH MG: 10 TABLET ORAL at 21:24

## 2023-08-10 RX ADMIN — BENZTROPINE MESYLATE SCH MG: 1 TABLET ORAL at 08:15

## 2023-08-10 RX ADMIN — HALOPERIDOL SCH MG: 5 TABLET ORAL at 08:15

## 2023-08-10 RX ADMIN — BENZTROPINE MESYLATE SCH MG: 1 TABLET ORAL at 16:51

## 2023-08-10 RX ADMIN — HALOPERIDOL SCH MG: 5 TABLET ORAL at 16:51

## 2023-08-10 RX ADMIN — ASPIRIN 81 MG SCH MG: 81 TABLET ORAL at 17:11

## 2023-08-10 RX ADMIN — AMLODIPINE BESYLATE SCH MG: 10 TABLET ORAL at 08:15

## 2023-08-11 VITALS — OXYGEN SATURATION: 97 % | TEMPERATURE: 98.2 F | SYSTOLIC BLOOD PRESSURE: 133 MMHG | DIASTOLIC BLOOD PRESSURE: 76 MMHG

## 2023-08-11 VITALS — TEMPERATURE: 98.1 F | DIASTOLIC BLOOD PRESSURE: 88 MMHG | SYSTOLIC BLOOD PRESSURE: 145 MMHG | OXYGEN SATURATION: 95 %

## 2023-08-11 VITALS — TEMPERATURE: 98.1 F | DIASTOLIC BLOOD PRESSURE: 88 MMHG | OXYGEN SATURATION: 95 % | SYSTOLIC BLOOD PRESSURE: 145 MMHG

## 2023-08-11 VITALS — DIASTOLIC BLOOD PRESSURE: 71 MMHG | OXYGEN SATURATION: 98 % | SYSTOLIC BLOOD PRESSURE: 120 MMHG | TEMPERATURE: 97.9 F

## 2023-08-11 RX ADMIN — HALOPERIDOL SCH MG: 5 TABLET ORAL at 12:59

## 2023-08-11 RX ADMIN — DORZOLAMIDE HYDROCHLORIDE AND TIMOLOL MALEATE SCH DROP: 20; 5 SOLUTION OPHTHALMIC at 08:43

## 2023-08-11 RX ADMIN — HALOPERIDOL SCH MG: 5 TABLET ORAL at 08:42

## 2023-08-11 RX ADMIN — Medication SCH MG: at 18:08

## 2023-08-11 RX ADMIN — DORZOLAMIDE HYDROCHLORIDE AND TIMOLOL MALEATE SCH DROP: 20; 5 SOLUTION OPHTHALMIC at 17:41

## 2023-08-11 RX ADMIN — MEMANTINE HYDROCHLORIDE SCH MG: 5 TABLET ORAL at 17:39

## 2023-08-11 RX ADMIN — BENZTROPINE MESYLATE SCH MG: 1 TABLET ORAL at 17:40

## 2023-08-11 RX ADMIN — HALOPERIDOL SCH MG: 5 TABLET ORAL at 17:40

## 2023-08-11 RX ADMIN — LISINOPRIL SCH MG: 20 TABLET ORAL at 08:42

## 2023-08-11 RX ADMIN — ASPIRIN 81 MG SCH MG: 81 TABLET ORAL at 17:40

## 2023-08-11 RX ADMIN — BENZTROPINE MESYLATE SCH MG: 1 TABLET ORAL at 08:43

## 2023-08-11 RX ADMIN — MEMANTINE HYDROCHLORIDE SCH MG: 5 TABLET ORAL at 08:43

## 2023-08-11 RX ADMIN — AMLODIPINE BESYLATE SCH MG: 10 TABLET ORAL at 08:42

## 2023-08-11 RX ADMIN — OLANZAPINE SCH MG: 10 TABLET ORAL at 21:19

## 2023-08-12 VITALS — SYSTOLIC BLOOD PRESSURE: 133 MMHG | OXYGEN SATURATION: 99 % | DIASTOLIC BLOOD PRESSURE: 69 MMHG | TEMPERATURE: 98.5 F

## 2023-08-12 VITALS — OXYGEN SATURATION: 97 % | TEMPERATURE: 98.2 F | SYSTOLIC BLOOD PRESSURE: 137 MMHG | DIASTOLIC BLOOD PRESSURE: 73 MMHG

## 2023-08-12 VITALS — SYSTOLIC BLOOD PRESSURE: 113 MMHG | TEMPERATURE: 97.4 F | DIASTOLIC BLOOD PRESSURE: 63 MMHG | OXYGEN SATURATION: 98 %

## 2023-08-12 RX ADMIN — MEMANTINE HYDROCHLORIDE SCH MG: 5 TABLET ORAL at 08:51

## 2023-08-12 RX ADMIN — DORZOLAMIDE HYDROCHLORIDE AND TIMOLOL MALEATE SCH DROP: 20; 5 SOLUTION OPHTHALMIC at 09:19

## 2023-08-12 RX ADMIN — BENZTROPINE MESYLATE SCH MG: 1 TABLET ORAL at 16:58

## 2023-08-12 RX ADMIN — ASPIRIN 81 MG SCH MG: 81 TABLET ORAL at 17:10

## 2023-08-12 RX ADMIN — LISINOPRIL SCH MG: 20 TABLET ORAL at 08:52

## 2023-08-12 RX ADMIN — HALOPERIDOL SCH MG: 5 TABLET ORAL at 16:58

## 2023-08-12 RX ADMIN — OLANZAPINE SCH MG: 10 TABLET ORAL at 21:09

## 2023-08-12 RX ADMIN — BENZTROPINE MESYLATE SCH MG: 1 TABLET ORAL at 08:51

## 2023-08-12 RX ADMIN — DORZOLAMIDE HYDROCHLORIDE AND TIMOLOL MALEATE SCH DROP: 20; 5 SOLUTION OPHTHALMIC at 17:01

## 2023-08-12 RX ADMIN — AMLODIPINE BESYLATE SCH MG: 10 TABLET ORAL at 08:52

## 2023-08-12 RX ADMIN — HALOPERIDOL SCH MG: 5 TABLET ORAL at 12:07

## 2023-08-12 RX ADMIN — MEMANTINE HYDROCHLORIDE SCH MG: 5 TABLET ORAL at 16:58

## 2023-08-12 RX ADMIN — Medication SCH MG: at 17:10

## 2023-08-12 RX ADMIN — HALOPERIDOL SCH MG: 5 TABLET ORAL at 08:51

## 2023-08-13 VITALS — OXYGEN SATURATION: 100 % | TEMPERATURE: 97.7 F | SYSTOLIC BLOOD PRESSURE: 152 MMHG | DIASTOLIC BLOOD PRESSURE: 104 MMHG

## 2023-08-13 VITALS — SYSTOLIC BLOOD PRESSURE: 160 MMHG | DIASTOLIC BLOOD PRESSURE: 88 MMHG | TEMPERATURE: 98 F | OXYGEN SATURATION: 97 %

## 2023-08-13 RX ADMIN — Medication SCH MG: at 17:03

## 2023-08-13 RX ADMIN — ASPIRIN 81 MG SCH MG: 81 TABLET ORAL at 17:03

## 2023-08-13 RX ADMIN — DORZOLAMIDE HYDROCHLORIDE AND TIMOLOL MALEATE SCH DROP: 20; 5 SOLUTION OPHTHALMIC at 08:58

## 2023-08-13 RX ADMIN — HALOPERIDOL SCH MG: 5 TABLET ORAL at 12:12

## 2023-08-13 RX ADMIN — OLANZAPINE SCH MG: 10 TABLET ORAL at 21:35

## 2023-08-13 RX ADMIN — BENZTROPINE MESYLATE SCH MG: 1 TABLET ORAL at 17:03

## 2023-08-13 RX ADMIN — LISINOPRIL SCH MG: 20 TABLET ORAL at 08:58

## 2023-08-13 RX ADMIN — AMLODIPINE BESYLATE SCH MG: 10 TABLET ORAL at 08:58

## 2023-08-13 RX ADMIN — BENZTROPINE MESYLATE SCH MG: 1 TABLET ORAL at 08:58

## 2023-08-13 RX ADMIN — MEMANTINE HYDROCHLORIDE SCH MG: 5 TABLET ORAL at 08:57

## 2023-08-13 RX ADMIN — HALOPERIDOL SCH MG: 5 TABLET ORAL at 17:03

## 2023-08-13 RX ADMIN — MEMANTINE HYDROCHLORIDE SCH MG: 5 TABLET ORAL at 17:03

## 2023-08-13 RX ADMIN — DORZOLAMIDE HYDROCHLORIDE AND TIMOLOL MALEATE SCH DROP: 20; 5 SOLUTION OPHTHALMIC at 17:04

## 2023-08-13 RX ADMIN — HALOPERIDOL SCH MG: 5 TABLET ORAL at 08:57

## 2023-08-14 VITALS — SYSTOLIC BLOOD PRESSURE: 113 MMHG | OXYGEN SATURATION: 97 % | DIASTOLIC BLOOD PRESSURE: 62 MMHG | TEMPERATURE: 97.2 F

## 2023-08-14 VITALS — TEMPERATURE: 97.8 F | DIASTOLIC BLOOD PRESSURE: 81 MMHG | SYSTOLIC BLOOD PRESSURE: 152 MMHG | OXYGEN SATURATION: 98 %

## 2023-08-14 VITALS — OXYGEN SATURATION: 99 % | SYSTOLIC BLOOD PRESSURE: 138 MMHG | DIASTOLIC BLOOD PRESSURE: 81 MMHG | TEMPERATURE: 98 F

## 2023-08-14 RX ADMIN — ASPIRIN 81 MG SCH MG: 81 TABLET ORAL at 17:13

## 2023-08-14 RX ADMIN — MEMANTINE HYDROCHLORIDE SCH MG: 5 TABLET ORAL at 16:34

## 2023-08-14 RX ADMIN — HALOPERIDOL SCH MG: 5 TABLET ORAL at 16:35

## 2023-08-14 RX ADMIN — BENZTROPINE MESYLATE SCH MG: 1 TABLET ORAL at 08:50

## 2023-08-14 RX ADMIN — DORZOLAMIDE HYDROCHLORIDE AND TIMOLOL MALEATE SCH DROP: 20; 5 SOLUTION OPHTHALMIC at 08:52

## 2023-08-14 RX ADMIN — MEMANTINE HYDROCHLORIDE SCH MG: 5 TABLET ORAL at 08:51

## 2023-08-14 RX ADMIN — HALOPERIDOL SCH MG: 5 TABLET ORAL at 08:51

## 2023-08-14 RX ADMIN — BENZTROPINE MESYLATE SCH MG: 1 TABLET ORAL at 16:35

## 2023-08-14 RX ADMIN — DORZOLAMIDE HYDROCHLORIDE AND TIMOLOL MALEATE SCH DROP: 20; 5 SOLUTION OPHTHALMIC at 16:35

## 2023-08-14 RX ADMIN — LISINOPRIL SCH MG: 20 TABLET ORAL at 08:51

## 2023-08-14 RX ADMIN — HALOPERIDOL SCH MG: 5 TABLET ORAL at 13:28

## 2023-08-14 RX ADMIN — AMLODIPINE BESYLATE SCH MG: 10 TABLET ORAL at 08:51

## 2023-08-14 RX ADMIN — OLANZAPINE SCH MG: 10 TABLET ORAL at 21:26

## 2023-08-14 RX ADMIN — Medication SCH MG: at 17:13

## 2023-08-14 RX ADMIN — TEMAZEPAM PRN MG: 7.5 CAPSULE ORAL at 22:53

## 2023-08-15 VITALS — DIASTOLIC BLOOD PRESSURE: 87 MMHG | SYSTOLIC BLOOD PRESSURE: 133 MMHG | TEMPERATURE: 98.8 F | OXYGEN SATURATION: 100 %

## 2023-08-15 VITALS — DIASTOLIC BLOOD PRESSURE: 87 MMHG | SYSTOLIC BLOOD PRESSURE: 133 MMHG

## 2023-08-15 RX ADMIN — HALOPERIDOL SCH MG: 5 TABLET ORAL at 12:24

## 2023-08-15 RX ADMIN — MEMANTINE HYDROCHLORIDE SCH MG: 5 TABLET ORAL at 08:13

## 2023-08-15 RX ADMIN — LISINOPRIL SCH MG: 20 TABLET ORAL at 08:12

## 2023-08-15 RX ADMIN — DORZOLAMIDE HYDROCHLORIDE AND TIMOLOL MALEATE SCH DROP: 20; 5 SOLUTION OPHTHALMIC at 08:13

## 2023-08-15 RX ADMIN — AMLODIPINE BESYLATE SCH MG: 10 TABLET ORAL at 08:13

## 2023-08-15 RX ADMIN — BENZTROPINE MESYLATE SCH MG: 1 TABLET ORAL at 08:13

## 2023-08-15 RX ADMIN — HALOPERIDOL SCH MG: 5 TABLET ORAL at 08:13

## 2023-10-17 ENCOUNTER — HOSPITAL ENCOUNTER (EMERGENCY)
Dept: HOSPITAL 54 - ER | Age: 73
LOS: 1 days | Discharge: HOME | End: 2023-10-18
Payer: MEDICARE

## 2023-10-17 VITALS — BODY MASS INDEX: 21.47 KG/M2 | WEIGHT: 150 LBS | HEIGHT: 70 IN

## 2023-10-17 VITALS — SYSTOLIC BLOOD PRESSURE: 137 MMHG | TEMPERATURE: 98.4 F | DIASTOLIC BLOOD PRESSURE: 94 MMHG
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
## 2023-10-17 DIAGNOSIS — E11.9: ICD-10-CM

## 2023-10-17 DIAGNOSIS — Z79.82: ICD-10-CM
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
## 2023-10-17 DIAGNOSIS — S51.011A: Primary | ICD-10-CM

## 2023-10-17 DIAGNOSIS — Z79.899: ICD-10-CM

## 2023-10-17 DIAGNOSIS — Z98.890: ICD-10-CM

## 2023-10-17 PROCEDURE — 73080 X-RAY EXAM OF ELBOW: CPT

## 2023-10-17 PROCEDURE — 99283 EMERGENCY DEPT VISIT LOW MDM: CPT

## 2023-10-17 PROCEDURE — 12001 RPR S/N/AX/GEN/TRNK 2.5CM/<: CPT

## 2023-10-18 VITALS — OXYGEN SATURATION: 98 %

## 2024-09-10 ENCOUNTER — HOSPITAL ENCOUNTER (INPATIENT)
Dept: HOSPITAL 12 - ER | Age: 74
LOS: 17 days | Discharge: NURSING, REHAB OR LONG-TERM CARE | DRG: 885 | End: 2024-09-27
Payer: MEDICARE

## 2024-09-10 VITALS — BODY MASS INDEX: 18.99 KG/M2 | HEIGHT: 74 IN | WEIGHT: 148 LBS

## 2024-09-10 VITALS — OXYGEN SATURATION: 95 % | DIASTOLIC BLOOD PRESSURE: 53 MMHG | SYSTOLIC BLOOD PRESSURE: 142 MMHG | TEMPERATURE: 98.1 F

## 2024-09-10 DIAGNOSIS — E11.22: ICD-10-CM

## 2024-09-10 DIAGNOSIS — E44.1: ICD-10-CM

## 2024-09-10 DIAGNOSIS — M15.9: ICD-10-CM

## 2024-09-10 DIAGNOSIS — F25.9: Primary | ICD-10-CM

## 2024-09-10 DIAGNOSIS — N18.9: ICD-10-CM

## 2024-09-10 DIAGNOSIS — F03.92: ICD-10-CM

## 2024-09-10 DIAGNOSIS — Z79.82: ICD-10-CM

## 2024-09-10 DIAGNOSIS — F03.93: ICD-10-CM

## 2024-09-10 DIAGNOSIS — F39: ICD-10-CM

## 2024-09-10 DIAGNOSIS — I12.9: ICD-10-CM

## 2024-09-10 DIAGNOSIS — F22: ICD-10-CM

## 2024-09-10 DIAGNOSIS — H54.7: ICD-10-CM

## 2024-09-10 LAB
ALBUMIN SERPL BCG-MCNC: 3.6 G/DL (ref 3.4–5)
ALP SERPL-CCNC: 62 U/L (ref 50–136)
ALT SERPL W/O P-5'-P-CCNC: 18 U/L (ref 16–63)
AMPHETAMINES UR QL SCN>1000 NG/ML: NEGATIVE
APAP SERPL-MCNC: 4.2 UG/ML (ref 10–30)
APPEARANCE UR: CLEAR
AST SERPL-CCNC: 10 U/L (ref 15–37)
BARBITURATES UR QL SCN: NEGATIVE
BASOPHILS # BLD AUTO: 0 K/UL (ref 0–0.2)
BASOPHILS NFR BLD AUTO: 0.6 % (ref 0–2)
BENZODIAZ UR QL SCN: NEGATIVE
BILIRUB DIRECT SERPL-MCNC: 0.1 MG/DL (ref 0–0.2)
BILIRUB SERPL-MCNC: 0.4 MG/DL (ref 0.2–1)
BILIRUB UR QL STRIP: NEGATIVE
BUN SERPL-MCNC: 20 MG/DL (ref 7–18)
CALCIUM SERPL-MCNC: 9.4 MG/DL (ref 8.5–10.1)
CHLORIDE SERPL-SCNC: 106 MMOL/L (ref 98–107)
CO2 SERPL-SCNC: 31 MMOL/L (ref 21–32)
COCAINE UR QL SCN: NEGATIVE
COLOR UR: YELLOW
CREAT SERPL-MCNC: 1.1 MG/DL (ref 0.6–1.3)
EOSINOPHIL # BLD AUTO: 0.3 K/UL (ref 0–0.7)
EOSINOPHIL NFR BLD AUTO: 4.4 % (ref 0–7)
ERYTHROCYTE [DISTWIDTH] IN BLOOD BY AUTOMATED COUNT: 14.7 % (ref 12.1–16.2)
ETHANOL SERPL-MCNC: < 3 MG/DL (ref 0–10)
FENTANYL UR-MCNC: NEGATIVE NG/ML
GLUCOSE SERPL-MCNC: 102 MG/DL (ref 74–106)
GLUCOSE UR STRIP-MCNC: NEGATIVE MG/DL
HCT VFR BLD AUTO: 42.4 % (ref 36.7–47.1)
HGB BLD-MCNC: 13.6 G/DL (ref 12.5–16.3)
HGB UR QL STRIP: NEGATIVE
KETONES UR STRIP-MCNC: NEGATIVE MG/DL
LEUKOCYTE ESTERASE UR QL STRIP: NEGATIVE
LYMPHOCYTES # BLD AUTO: 1.2 K/UL (ref 0.8–4.8)
LYMPHOCYTES NFR BLD AUTO: 19.6 % (ref 20.5–51.5)
MANUAL DIF COMMENT BLD-IMP: 1
MCH RBC QN AUTO: 27 UUG (ref 23.8–33.4)
MCHC RBC AUTO-ENTMCNC: 32 G/DL (ref 32.5–36.3)
MCV RBC AUTO: 84.3 FL (ref 73–96.2)
MONOCYTES # BLD AUTO: 0.2 K/UL (ref 0.1–1.3)
MONOCYTES NFR BLD AUTO: 3.1 % (ref 0–11)
NEUTROPHILS # BLD AUTO: 4.5 K/UL (ref 1.8–8.9)
NEUTROPHILS NFR BLD AUTO: 72.3 % (ref 38.5–71.5)
NITRITE UR QL STRIP: NEGATIVE
OPIATES UR QL SCN: NEGATIVE
PCP UR QL SCN>25 NG/ML: NEGATIVE
PH UR STRIP: 6 [PH] (ref 5–8)
PLATELET # BLD AUTO: 216 K/UL (ref 152–348)
POTASSIUM SERPL-SCNC: 4.2 MMOL/L (ref 3.5–5.1)
PROT UR QL STRIP: NEGATIVE
RBC # BLD AUTO: 5.03 MIL/UL (ref 4.06–5.63)
SODIUM SERPL-SCNC: 143 MMOL/L (ref 136–145)
SP GR UR STRIP: 1.01 (ref 1–1.03)
THC UR QL SCN>50 NG/ML: NEGATIVE
UROBILINOGEN UR STRIP-MCNC: 0.2 E.U./DL
WBC # BLD AUTO: 6.3 K/UL (ref 3.6–10.2)
WS STN SPEC: 7.1 G/DL (ref 6.4–8.2)

## 2024-09-10 PROCEDURE — G0480 DRUG TEST DEF 1-7 CLASSES: HCPCS

## 2024-09-10 RX ADMIN — Medication ONE EACH: at 20:00

## 2024-09-10 RX ADMIN — ACETAMINOPHEN PRN MG: 325 TABLET ORAL at 21:51

## 2024-09-10 RX ADMIN — TEMAZEPAM PRN MG: 7.5 CAPSULE ORAL at 21:51

## 2024-09-10 RX ADMIN — OLANZAPINE ONE MG: 5 TABLET ORAL at 18:23

## 2024-09-11 VITALS — SYSTOLIC BLOOD PRESSURE: 120 MMHG | TEMPERATURE: 98.3 F | OXYGEN SATURATION: 99 % | DIASTOLIC BLOOD PRESSURE: 70 MMHG

## 2024-09-11 VITALS — OXYGEN SATURATION: 96 % | SYSTOLIC BLOOD PRESSURE: 122 MMHG | DIASTOLIC BLOOD PRESSURE: 75 MMHG | TEMPERATURE: 98.4 F

## 2024-09-11 VITALS — SYSTOLIC BLOOD PRESSURE: 150 MMHG | DIASTOLIC BLOOD PRESSURE: 86 MMHG | TEMPERATURE: 98.2 F | OXYGEN SATURATION: 98 %

## 2024-09-11 RX ADMIN — OLANZAPINE SCH MG: 5 TABLET ORAL at 20:17

## 2024-09-11 RX ADMIN — TEMAZEPAM PRN MG: 7.5 CAPSULE ORAL at 20:17

## 2024-09-11 RX ADMIN — DORZOLAMIDE HYDROCHLORIDE AND TIMOLOL MALEATE SCH DROP: 20; 5 SOLUTION OPHTHALMIC at 16:52

## 2024-09-11 RX ADMIN — BENZTROPINE MESYLATE SCH MG: 1 TABLET ORAL at 16:51

## 2024-09-11 RX ADMIN — DOCUSATE SODIUM SCH MG: 100 CAPSULE, LIQUID FILLED ORAL at 17:01

## 2024-09-11 RX ADMIN — HALOPERIDOL SCH MG: 5 TABLET ORAL at 12:40

## 2024-09-12 VITALS — OXYGEN SATURATION: 98 % | TEMPERATURE: 98 F | DIASTOLIC BLOOD PRESSURE: 79 MMHG | SYSTOLIC BLOOD PRESSURE: 130 MMHG

## 2024-09-12 VITALS — DIASTOLIC BLOOD PRESSURE: 89 MMHG | TEMPERATURE: 98 F | OXYGEN SATURATION: 96 % | SYSTOLIC BLOOD PRESSURE: 148 MMHG

## 2024-09-12 VITALS — TEMPERATURE: 98 F | SYSTOLIC BLOOD PRESSURE: 126 MMHG | DIASTOLIC BLOOD PRESSURE: 88 MMHG | OXYGEN SATURATION: 98 %

## 2024-09-12 RX ADMIN — ASPIRIN SCH MG: 81 TABLET, CHEWABLE ORAL at 09:00

## 2024-09-12 RX ADMIN — LISINOPRIL SCH MG: 20 TABLET ORAL at 09:00

## 2024-09-12 RX ADMIN — AMLODIPINE BESYLATE SCH MG: 10 TABLET ORAL at 09:17

## 2024-09-12 RX ADMIN — MELOXICAM SCH MG: 7.5 TABLET ORAL at 09:17

## 2024-09-12 RX ADMIN — LORAZEPAM PRN MG: 0.5 TABLET ORAL at 11:45

## 2024-09-13 VITALS — TEMPERATURE: 98 F | DIASTOLIC BLOOD PRESSURE: 71 MMHG | OXYGEN SATURATION: 96 % | SYSTOLIC BLOOD PRESSURE: 136 MMHG

## 2024-09-13 VITALS — SYSTOLIC BLOOD PRESSURE: 105 MMHG | DIASTOLIC BLOOD PRESSURE: 54 MMHG | OXYGEN SATURATION: 96 % | TEMPERATURE: 98 F

## 2024-09-13 VITALS — TEMPERATURE: 98 F | SYSTOLIC BLOOD PRESSURE: 128 MMHG | DIASTOLIC BLOOD PRESSURE: 62 MMHG | OXYGEN SATURATION: 96 %

## 2024-09-13 RX ADMIN — OLANZAPINE SCH MG: 5 TABLET ORAL at 20:20

## 2024-09-14 VITALS — DIASTOLIC BLOOD PRESSURE: 65 MMHG | OXYGEN SATURATION: 95 % | TEMPERATURE: 97.9 F | SYSTOLIC BLOOD PRESSURE: 139 MMHG

## 2024-09-14 VITALS — DIASTOLIC BLOOD PRESSURE: 89 MMHG | OXYGEN SATURATION: 97 % | SYSTOLIC BLOOD PRESSURE: 141 MMHG | TEMPERATURE: 97.9 F

## 2024-09-14 VITALS — DIASTOLIC BLOOD PRESSURE: 69 MMHG | TEMPERATURE: 97.4 F | SYSTOLIC BLOOD PRESSURE: 131 MMHG | OXYGEN SATURATION: 96 %

## 2024-09-15 VITALS — SYSTOLIC BLOOD PRESSURE: 124 MMHG | TEMPERATURE: 98.1 F | DIASTOLIC BLOOD PRESSURE: 53 MMHG | OXYGEN SATURATION: 97 %

## 2024-09-15 VITALS — SYSTOLIC BLOOD PRESSURE: 95 MMHG | TEMPERATURE: 98 F | OXYGEN SATURATION: 98 % | DIASTOLIC BLOOD PRESSURE: 59 MMHG

## 2024-09-15 VITALS — TEMPERATURE: 98.2 F | OXYGEN SATURATION: 97 % | DIASTOLIC BLOOD PRESSURE: 79 MMHG | SYSTOLIC BLOOD PRESSURE: 138 MMHG

## 2024-09-16 VITALS — OXYGEN SATURATION: 97 % | SYSTOLIC BLOOD PRESSURE: 121 MMHG | TEMPERATURE: 98.4 F | DIASTOLIC BLOOD PRESSURE: 64 MMHG

## 2024-09-16 VITALS — OXYGEN SATURATION: 97 % | SYSTOLIC BLOOD PRESSURE: 124 MMHG | DIASTOLIC BLOOD PRESSURE: 66 MMHG | TEMPERATURE: 98.3 F

## 2024-09-16 VITALS — OXYGEN SATURATION: 96 % | TEMPERATURE: 98.2 F | SYSTOLIC BLOOD PRESSURE: 126 MMHG | DIASTOLIC BLOOD PRESSURE: 60 MMHG

## 2024-09-17 VITALS — DIASTOLIC BLOOD PRESSURE: 61 MMHG | SYSTOLIC BLOOD PRESSURE: 118 MMHG | OXYGEN SATURATION: 96 % | TEMPERATURE: 98.2 F

## 2024-09-17 VITALS — SYSTOLIC BLOOD PRESSURE: 111 MMHG | TEMPERATURE: 97.8 F | OXYGEN SATURATION: 95 % | DIASTOLIC BLOOD PRESSURE: 58 MMHG

## 2024-09-17 VITALS — TEMPERATURE: 98 F | OXYGEN SATURATION: 96 % | DIASTOLIC BLOOD PRESSURE: 57 MMHG | SYSTOLIC BLOOD PRESSURE: 104 MMHG

## 2024-09-18 VITALS — TEMPERATURE: 98 F | OXYGEN SATURATION: 100 % | DIASTOLIC BLOOD PRESSURE: 56 MMHG | SYSTOLIC BLOOD PRESSURE: 104 MMHG

## 2024-09-18 VITALS — OXYGEN SATURATION: 96 % | TEMPERATURE: 98 F | SYSTOLIC BLOOD PRESSURE: 138 MMHG | DIASTOLIC BLOOD PRESSURE: 91 MMHG

## 2024-09-18 VITALS — OXYGEN SATURATION: 98 % | DIASTOLIC BLOOD PRESSURE: 66 MMHG | TEMPERATURE: 98.1 F | SYSTOLIC BLOOD PRESSURE: 126 MMHG

## 2024-09-18 RX ADMIN — Medication SCH ML: at 08:00

## 2024-09-19 VITALS — SYSTOLIC BLOOD PRESSURE: 103 MMHG | DIASTOLIC BLOOD PRESSURE: 64 MMHG | TEMPERATURE: 98.2 F | OXYGEN SATURATION: 98 %

## 2024-09-19 VITALS — SYSTOLIC BLOOD PRESSURE: 104 MMHG | TEMPERATURE: 98 F | DIASTOLIC BLOOD PRESSURE: 56 MMHG | OXYGEN SATURATION: 98 %

## 2024-09-19 VITALS — OXYGEN SATURATION: 98 % | DIASTOLIC BLOOD PRESSURE: 81 MMHG | TEMPERATURE: 98 F | SYSTOLIC BLOOD PRESSURE: 169 MMHG

## 2024-09-19 VITALS — SYSTOLIC BLOOD PRESSURE: 114 MMHG | TEMPERATURE: 98.3 F | DIASTOLIC BLOOD PRESSURE: 64 MMHG | OXYGEN SATURATION: 96 %

## 2024-09-19 LAB
BASOPHILS # BLD AUTO: 0.1 K/UL (ref 0–0.2)
BASOPHILS NFR BLD AUTO: 1.7 % (ref 0–2)
BUN SERPL-MCNC: 23 MG/DL (ref 7–18)
CALCIUM SERPL-MCNC: 9.4 MG/DL (ref 8.5–10.1)
CHLORIDE SERPL-SCNC: 107 MMOL/L (ref 98–107)
CO2 SERPL-SCNC: 31 MMOL/L (ref 21–32)
CREAT SERPL-MCNC: 1.2 MG/DL (ref 0.6–1.3)
EOSINOPHIL # BLD AUTO: 0.4 K/UL (ref 0–0.7)
EOSINOPHIL NFR BLD AUTO: 7.6 % (ref 0–7)
ERYTHROCYTE [DISTWIDTH] IN BLOOD BY AUTOMATED COUNT: 14.4 % (ref 12.1–16.2)
GLUCOSE SERPL-MCNC: 86 MG/DL (ref 74–106)
HCT VFR BLD AUTO: 41.3 % (ref 36.7–47.1)
HGB BLD-MCNC: 13.6 G/DL (ref 12.5–16.3)
LYMPHOCYTES # BLD AUTO: 2 K/UL (ref 0.8–4.8)
LYMPHOCYTES NFR BLD AUTO: 36.9 % (ref 20.5–51.5)
MANUAL DIF COMMENT BLD-IMP: 1
MCH RBC QN AUTO: 27.6 UUG (ref 23.8–33.4)
MCHC RBC AUTO-ENTMCNC: 33 G/DL (ref 32.5–36.3)
MCV RBC AUTO: 83.7 FL (ref 73–96.2)
MONOCYTES # BLD AUTO: 0.4 K/UL (ref 0.1–1.3)
MONOCYTES NFR BLD AUTO: 7.7 % (ref 0–11)
NEUTROPHILS # BLD AUTO: 2.5 K/UL (ref 1.8–8.9)
NEUTROPHILS NFR BLD AUTO: 46.1 % (ref 38.5–71.5)
PLATELET # BLD AUTO: 188 K/UL (ref 152–348)
POTASSIUM SERPL-SCNC: 4.2 MMOL/L (ref 3.5–5.1)
RBC # BLD AUTO: 4.93 MIL/UL (ref 4.06–5.63)
SODIUM SERPL-SCNC: 141 MMOL/L (ref 136–145)
WBC # BLD AUTO: 5.5 K/UL (ref 3.6–10.2)

## 2024-09-20 VITALS — OXYGEN SATURATION: 96 % | SYSTOLIC BLOOD PRESSURE: 133 MMHG | DIASTOLIC BLOOD PRESSURE: 65 MMHG | TEMPERATURE: 97.7 F

## 2024-09-20 VITALS — DIASTOLIC BLOOD PRESSURE: 68 MMHG | SYSTOLIC BLOOD PRESSURE: 128 MMHG | OXYGEN SATURATION: 98 % | TEMPERATURE: 98 F

## 2024-09-20 VITALS — SYSTOLIC BLOOD PRESSURE: 104 MMHG | OXYGEN SATURATION: 98 % | TEMPERATURE: 98 F | DIASTOLIC BLOOD PRESSURE: 65 MMHG

## 2024-09-21 VITALS — DIASTOLIC BLOOD PRESSURE: 79 MMHG | TEMPERATURE: 98.1 F | OXYGEN SATURATION: 97 % | SYSTOLIC BLOOD PRESSURE: 130 MMHG

## 2024-09-21 VITALS — DIASTOLIC BLOOD PRESSURE: 75 MMHG | SYSTOLIC BLOOD PRESSURE: 125 MMHG | OXYGEN SATURATION: 97 % | TEMPERATURE: 98 F

## 2024-09-21 VITALS — DIASTOLIC BLOOD PRESSURE: 80 MMHG | SYSTOLIC BLOOD PRESSURE: 164 MMHG | TEMPERATURE: 98 F | OXYGEN SATURATION: 98 %

## 2024-09-22 VITALS — DIASTOLIC BLOOD PRESSURE: 67 MMHG | SYSTOLIC BLOOD PRESSURE: 100 MMHG | TEMPERATURE: 98.1 F | OXYGEN SATURATION: 96 %

## 2024-09-22 VITALS — DIASTOLIC BLOOD PRESSURE: 56 MMHG | TEMPERATURE: 98 F | SYSTOLIC BLOOD PRESSURE: 96 MMHG | OXYGEN SATURATION: 97 %

## 2024-09-22 VITALS — SYSTOLIC BLOOD PRESSURE: 120 MMHG | DIASTOLIC BLOOD PRESSURE: 58 MMHG | TEMPERATURE: 98.3 F | OXYGEN SATURATION: 97 %

## 2024-09-23 VITALS — SYSTOLIC BLOOD PRESSURE: 128 MMHG | DIASTOLIC BLOOD PRESSURE: 67 MMHG | TEMPERATURE: 98.5 F | OXYGEN SATURATION: 97 %

## 2024-09-23 VITALS — SYSTOLIC BLOOD PRESSURE: 120 MMHG | DIASTOLIC BLOOD PRESSURE: 56 MMHG | TEMPERATURE: 98.4 F | OXYGEN SATURATION: 96 %

## 2024-09-23 VITALS — SYSTOLIC BLOOD PRESSURE: 118 MMHG | TEMPERATURE: 98.3 F | OXYGEN SATURATION: 96 % | DIASTOLIC BLOOD PRESSURE: 58 MMHG

## 2024-09-23 RX ADMIN — OLANZAPINE SCH MG: 5 TABLET ORAL at 20:33

## 2024-09-24 VITALS — DIASTOLIC BLOOD PRESSURE: 74 MMHG | SYSTOLIC BLOOD PRESSURE: 116 MMHG | TEMPERATURE: 98.1 F | OXYGEN SATURATION: 98 %

## 2024-09-24 VITALS — OXYGEN SATURATION: 98 % | DIASTOLIC BLOOD PRESSURE: 63 MMHG | SYSTOLIC BLOOD PRESSURE: 136 MMHG | TEMPERATURE: 98.2 F

## 2024-09-24 VITALS — SYSTOLIC BLOOD PRESSURE: 114 MMHG | OXYGEN SATURATION: 100 % | DIASTOLIC BLOOD PRESSURE: 78 MMHG | TEMPERATURE: 98 F

## 2024-09-25 VITALS — OXYGEN SATURATION: 96 % | SYSTOLIC BLOOD PRESSURE: 126 MMHG | TEMPERATURE: 98 F | DIASTOLIC BLOOD PRESSURE: 64 MMHG

## 2024-09-25 VITALS — DIASTOLIC BLOOD PRESSURE: 69 MMHG | SYSTOLIC BLOOD PRESSURE: 116 MMHG | TEMPERATURE: 98 F | OXYGEN SATURATION: 98 %

## 2024-09-25 VITALS — OXYGEN SATURATION: 98 % | SYSTOLIC BLOOD PRESSURE: 129 MMHG | DIASTOLIC BLOOD PRESSURE: 71 MMHG | TEMPERATURE: 98 F

## 2024-09-26 VITALS — DIASTOLIC BLOOD PRESSURE: 84 MMHG | SYSTOLIC BLOOD PRESSURE: 156 MMHG | OXYGEN SATURATION: 98 % | TEMPERATURE: 98 F

## 2024-09-26 VITALS — OXYGEN SATURATION: 96 % | SYSTOLIC BLOOD PRESSURE: 120 MMHG | DIASTOLIC BLOOD PRESSURE: 66 MMHG | TEMPERATURE: 98 F

## 2024-09-26 VITALS — SYSTOLIC BLOOD PRESSURE: 121 MMHG | OXYGEN SATURATION: 98 % | TEMPERATURE: 97.9 F | DIASTOLIC BLOOD PRESSURE: 45 MMHG

## 2024-09-27 VITALS — DIASTOLIC BLOOD PRESSURE: 74 MMHG | TEMPERATURE: 98 F | OXYGEN SATURATION: 99 % | SYSTOLIC BLOOD PRESSURE: 116 MMHG

## 2024-09-27 VITALS — DIASTOLIC BLOOD PRESSURE: 74 MMHG | SYSTOLIC BLOOD PRESSURE: 116 MMHG

## 2024-09-27 RX ADMIN — OLANZAPINE SCH MG: 2.5 TABLET ORAL at 13:30

## 2024-10-31 ENCOUNTER — HOSPITAL ENCOUNTER (INPATIENT)
Dept: HOSPITAL 54 - ER | Age: 74
LOS: 5 days | Discharge: NURSING, REHAB OR LONG-TERM CARE | DRG: 683 | End: 2024-11-05
Attending: INTERNAL MEDICINE | Admitting: INTERNAL MEDICINE
Payer: MEDICARE

## 2024-10-31 VITALS — WEIGHT: 141.12 LBS | BODY MASS INDEX: 20.9 KG/M2 | HEIGHT: 69 IN

## 2024-10-31 DIAGNOSIS — E11.22: ICD-10-CM

## 2024-10-31 DIAGNOSIS — H54.7: ICD-10-CM

## 2024-10-31 DIAGNOSIS — N17.0: Primary | ICD-10-CM

## 2024-10-31 DIAGNOSIS — F03.90: ICD-10-CM

## 2024-10-31 DIAGNOSIS — F20.0: ICD-10-CM

## 2024-10-31 DIAGNOSIS — E11.40: ICD-10-CM

## 2024-10-31 DIAGNOSIS — N18.9: ICD-10-CM

## 2024-10-31 DIAGNOSIS — I12.9: ICD-10-CM

## 2024-10-31 DIAGNOSIS — M19.90: ICD-10-CM

## 2024-10-31 DIAGNOSIS — Z20.822: ICD-10-CM

## 2024-10-31 DIAGNOSIS — Z87.891: ICD-10-CM

## 2024-10-31 LAB
ALBUMIN SERPL BCP-MCNC: 3.6 G/DL (ref 3.4–5)
ALP SERPL-CCNC: 74 U/L (ref 46–116)
ALT SERPL W P-5'-P-CCNC: 15 U/L (ref 12–78)
APAP SERPL-MCNC: < 2 UG/ML (ref 10–30)
APPEARANCE UR: CLEAR
AST SERPL W P-5'-P-CCNC: 13 U/L (ref 15–37)
BASOPHILS # BLD AUTO: 0.1 K/UL (ref 0–0.2)
BASOPHILS NFR BLD AUTO: 1.2 % (ref 0–2)
BILIRUB DIRECT SERPL-MCNC: 0.1 MG/DL (ref 0–0.2)
BILIRUB SERPL-MCNC: 0.4 MG/DL (ref 0.2–1)
BILIRUB UR QL STRIP: NEGATIVE
BUN SERPL-MCNC: 23 MG/DL (ref 7–18)
CALCIUM SERPL-MCNC: 9.7 MG/DL (ref 8.5–10.1)
CHLORIDE SERPL-SCNC: 108 MMOL/L (ref 98–107)
CO2 SERPL-SCNC: 30 MMOL/L (ref 21–32)
COLOR UR: YELLOW
CREAT SERPL-MCNC: 2.3 MG/DL (ref 0.6–1.3)
EOSINOPHIL # BLD AUTO: 0.1 K/UL (ref 0–0.7)
EOSINOPHIL NFR BLD AUTO: 0.9 % (ref 0–6)
ERYTHROCYTE [DISTWIDTH] IN BLOOD BY AUTOMATED COUNT: 14.5 % (ref 11.5–15)
ETHANOL SERPL-MCNC: < 3 MG/DL (ref 0–10)
GLUCOSE SERPL-MCNC: 101 MG/DL (ref 74–106)
GLUCOSE UR STRIP-MCNC: NEGATIVE MG/DL
HCT VFR BLD AUTO: 39 % (ref 39–51)
HGB BLD-MCNC: 12.7 G/DL (ref 13.5–17.5)
HGB UR QL STRIP: NEGATIVE ERY/UL
KETONES UR STRIP-MCNC: NEGATIVE MG/DL
LEUKOCYTE ESTERASE UR QL STRIP: NEGATIVE
LYMPHOCYTES NFR BLD AUTO: 1.8 K/UL (ref 0.8–4.8)
LYMPHOCYTES NFR BLD AUTO: 17.9 % (ref 20–44)
MCH RBC QN AUTO: 27 PG (ref 26–33)
MCHC RBC AUTO-ENTMCNC: 32 G/DL (ref 31–36)
MCV RBC AUTO: 83 FL (ref 80–96)
MONOCYTES NFR BLD AUTO: 0.8 K/UL (ref 0.1–1.3)
MONOCYTES NFR BLD AUTO: 8.4 % (ref 2–12)
NEUTROPHILS # BLD AUTO: 7.2 K/UL (ref 1.8–8.9)
NEUTROPHILS NFR BLD AUTO: 71.6 % (ref 43–81)
NITRITE UR QL STRIP: NEGATIVE
PH UR STRIP: 6.5 [PH] (ref 5–8)
PLATELET # BLD AUTO: 247 K/UL (ref 150–450)
POTASSIUM SERPL-SCNC: 5 MMOL/L (ref 3.5–5.1)
PROT SERPL-MCNC: 7.4 G/DL (ref 6.4–8.2)
PROT UR QL STRIP: NEGATIVE MG/DL
RBC # BLD AUTO: 4.74 MIL/UL (ref 4.5–6)
SALICYLATES SERPL-MCNC: 1.2 MG/DL (ref 2.8–20)
SODIUM SERPL-SCNC: 142 MMOL/L (ref 136–145)
SP GR UR STRIP: 1.01 (ref 1–1.03)
UROBILINOGEN UR STRIP-MCNC: 0.2 EU/DL
WBC NRBC COR # BLD AUTO: 10 K/UL (ref 4.3–11)

## 2024-10-31 PROCEDURE — G0378 HOSPITAL OBSERVATION PER HR: HCPCS

## 2024-10-31 PROCEDURE — G0480 DRUG TEST DEF 1-7 CLASSES: HCPCS

## 2024-11-01 VITALS — OXYGEN SATURATION: 96 % | DIASTOLIC BLOOD PRESSURE: 71 MMHG | TEMPERATURE: 98.2 F | SYSTOLIC BLOOD PRESSURE: 129 MMHG

## 2024-11-01 VITALS — DIASTOLIC BLOOD PRESSURE: 75 MMHG | OXYGEN SATURATION: 94 % | SYSTOLIC BLOOD PRESSURE: 139 MMHG | TEMPERATURE: 98.2 F

## 2024-11-01 VITALS — OXYGEN SATURATION: 95 % | TEMPERATURE: 98.4 F | SYSTOLIC BLOOD PRESSURE: 98 MMHG | DIASTOLIC BLOOD PRESSURE: 58 MMHG

## 2024-11-01 VITALS — OXYGEN SATURATION: 99 % | SYSTOLIC BLOOD PRESSURE: 136 MMHG | DIASTOLIC BLOOD PRESSURE: 85 MMHG | TEMPERATURE: 97.9 F

## 2024-11-01 LAB
AMPHETAMINES UR QL: NEGATIVE
BARBITURATES UR QL SCN: NEGATIVE
BENZODIAZ UR QL SCN: NEGATIVE
CANNABINOIDS UR QL SCN: NEGATIVE
COCAINE UR QL SCN: NEGATIVE
OPIATES UR QL SCN: NEGATIVE
PCP UR QL SCN: NEGATIVE

## 2024-11-01 RX ADMIN — Medication SCH MG: at 18:15

## 2024-11-01 RX ADMIN — ACETAMINOPHEN PRN MG: 325 TABLET ORAL at 02:41

## 2024-11-01 RX ADMIN — OLANZAPINE SCH MG: 10 TABLET ORAL at 18:15

## 2024-11-01 RX ADMIN — ASPIRIN 81 MG SCH MG: 81 TABLET ORAL at 18:15

## 2024-11-01 RX ADMIN — BENZTROPINE MESYLATE SCH MG: 1 TABLET ORAL at 08:46

## 2024-11-01 RX ADMIN — TEMAZEPAM PRN MG: 15 CAPSULE ORAL at 22:00

## 2024-11-01 RX ADMIN — INSULIN HUMAN PRN UNITS: 100 INJECTION, SOLUTION PARENTERAL at 07:08

## 2024-11-01 RX ADMIN — Medication SCH EACH: at 06:59

## 2024-11-01 RX ADMIN — HALOPERIDOL SCH MG: 5 TABLET ORAL at 08:38

## 2024-11-01 RX ADMIN — AMLODIPINE BESYLATE SCH MG: 10 TABLET ORAL at 08:30

## 2024-11-01 RX ADMIN — OLANZAPINE SCH MG: 2.5 TABLET ORAL at 08:38

## 2024-11-01 RX ADMIN — DORZOLAMIDE HYDROCHLORIDE AND TIMOLOL MALEATE SCH ML: 20; 5 SOLUTION OPHTHALMIC at 08:38

## 2024-11-02 VITALS — SYSTOLIC BLOOD PRESSURE: 125 MMHG | TEMPERATURE: 98 F | OXYGEN SATURATION: 94 % | DIASTOLIC BLOOD PRESSURE: 73 MMHG

## 2024-11-02 LAB
APPEARANCE UR: CLEAR
BILIRUB UR QL STRIP: NEGATIVE
COLOR UR: YELLOW
CREAT UR-MCNC: < 13 MG/DL (ref 30–125)
GLUCOSE UR STRIP-MCNC: NEGATIVE MG/DL
HGB UR QL STRIP: NEGATIVE ERY/UL
KETONES UR STRIP-MCNC: NEGATIVE MG/DL
LEUKOCYTE ESTERASE UR QL STRIP: NEGATIVE
NITRITE UR QL STRIP: NEGATIVE
PH UR STRIP: 6 [PH] (ref 5–8)
PROT UR QL STRIP: NEGATIVE MG/DL
PROT UR-MCNC: < 6 MG/DL (ref 0–11.9)
SODIUM UR-SCNC: 20 MMOL/L (ref 40–220)
SP GR UR STRIP: <1.005 (ref 1–1.03)
UROBILINOGEN UR STRIP-MCNC: 0.2 EU/DL

## 2024-11-02 RX ADMIN — SODIUM CHLORIDE SCH MLS/HR: 9 INJECTION, SOLUTION INTRAVENOUS at 09:30

## 2024-11-03 VITALS — DIASTOLIC BLOOD PRESSURE: 74 MMHG | TEMPERATURE: 97 F | SYSTOLIC BLOOD PRESSURE: 141 MMHG

## 2024-11-03 VITALS — SYSTOLIC BLOOD PRESSURE: 126 MMHG | OXYGEN SATURATION: 98 % | DIASTOLIC BLOOD PRESSURE: 71 MMHG | TEMPERATURE: 98.1 F

## 2024-11-03 LAB
ALBUMIN SERPL BCP-MCNC: 2.9 G/DL (ref 3.4–5)
ALP SERPL-CCNC: 62 U/L (ref 46–116)
ALT SERPL W P-5'-P-CCNC: 10 U/L (ref 12–78)
AST SERPL W P-5'-P-CCNC: 9 U/L (ref 15–37)
BASOPHILS # BLD AUTO: 0.1 K/UL (ref 0–0.2)
BASOPHILS NFR BLD AUTO: 1.2 % (ref 0–2)
BILIRUB SERPL-MCNC: 0.3 MG/DL (ref 0.2–1)
BUN SERPL-MCNC: 23 MG/DL (ref 7–18)
CALCIUM SERPL-MCNC: 8.8 MG/DL (ref 8.5–10.1)
CHLORIDE SERPL-SCNC: 105 MMOL/L (ref 98–107)
CO2 SERPL-SCNC: 28 MMOL/L (ref 21–32)
CREAT SERPL-MCNC: 1.5 MG/DL (ref 0.6–1.3)
EOSINOPHIL # BLD AUTO: 0.2 K/UL (ref 0–0.7)
EOSINOPHIL NFR BLD AUTO: 3.4 % (ref 0–6)
ERYTHROCYTE [DISTWIDTH] IN BLOOD BY AUTOMATED COUNT: 14.3 % (ref 11.5–15)
GLUCOSE SERPL-MCNC: 120 MG/DL (ref 74–106)
HCT VFR BLD AUTO: 37 % (ref 39–51)
HGB BLD-MCNC: 12.3 G/DL (ref 13.5–17.5)
LYMPHOCYTES NFR BLD AUTO: 1.8 K/UL (ref 0.8–4.8)
LYMPHOCYTES NFR BLD AUTO: 31.3 % (ref 20–44)
MAGNESIUM SERPL-MCNC: 1.9 MG/DL (ref 1.8–2.4)
MCH RBC QN AUTO: 27 PG (ref 26–33)
MCHC RBC AUTO-ENTMCNC: 33 G/DL (ref 31–36)
MCV RBC AUTO: 83 FL (ref 80–96)
MONOCYTES NFR BLD AUTO: 0.5 K/UL (ref 0.1–1.3)
MONOCYTES NFR BLD AUTO: 8.4 % (ref 2–12)
NEUTROPHILS # BLD AUTO: 3.2 K/UL (ref 1.8–8.9)
NEUTROPHILS NFR BLD AUTO: 55.7 % (ref 43–81)
PHOSPHATE SERPL-MCNC: 3.5 MG/DL (ref 2.5–4.9)
PLATELET # BLD AUTO: 238 K/UL (ref 150–450)
POTASSIUM SERPL-SCNC: 4.2 MMOL/L (ref 3.5–5.1)
PROT SERPL-MCNC: 6.3 G/DL (ref 6.4–8.2)
RBC # BLD AUTO: 4.49 MIL/UL (ref 4.5–6)
SODIUM SERPL-SCNC: 138 MMOL/L (ref 136–145)
WBC NRBC COR # BLD AUTO: 5.7 K/UL (ref 4.3–11)

## 2024-11-04 VITALS — SYSTOLIC BLOOD PRESSURE: 126 MMHG | DIASTOLIC BLOOD PRESSURE: 66 MMHG | OXYGEN SATURATION: 96 % | TEMPERATURE: 98.1 F

## 2024-11-05 VITALS — SYSTOLIC BLOOD PRESSURE: 141 MMHG | TEMPERATURE: 98.4 F | OXYGEN SATURATION: 97 % | DIASTOLIC BLOOD PRESSURE: 83 MMHG

## 2024-11-05 VITALS — DIASTOLIC BLOOD PRESSURE: 83 MMHG | SYSTOLIC BLOOD PRESSURE: 141 MMHG

## 2024-11-05 VITALS — TEMPERATURE: 98.1 F | SYSTOLIC BLOOD PRESSURE: 126 MMHG | DIASTOLIC BLOOD PRESSURE: 66 MMHG | OXYGEN SATURATION: 96 %

## 2024-11-06 ENCOUNTER — HOSPITAL ENCOUNTER (INPATIENT)
Dept: HOSPITAL 54 - ER | Age: 74
LOS: 1 days | Discharge: PSYCHIATRIC OR SUBSTANCE USE FACILITY | DRG: 640 | End: 2024-11-07
Attending: NURSE PRACTITIONER | Admitting: NURSE PRACTITIONER
Payer: MEDICARE

## 2024-11-06 VITALS — BODY MASS INDEX: 20.9 KG/M2 | HEIGHT: 69 IN | WEIGHT: 141.1 LBS

## 2024-11-06 VITALS — OXYGEN SATURATION: 100 % | SYSTOLIC BLOOD PRESSURE: 120 MMHG | DIASTOLIC BLOOD PRESSURE: 78 MMHG | TEMPERATURE: 98.2 F

## 2024-11-06 DIAGNOSIS — M19.90: ICD-10-CM

## 2024-11-06 DIAGNOSIS — Z73.6: ICD-10-CM

## 2024-11-06 DIAGNOSIS — N18.9: ICD-10-CM

## 2024-11-06 DIAGNOSIS — F25.9: ICD-10-CM

## 2024-11-06 DIAGNOSIS — F03.918: ICD-10-CM

## 2024-11-06 DIAGNOSIS — R62.7: Primary | ICD-10-CM

## 2024-11-06 DIAGNOSIS — H54.8: ICD-10-CM

## 2024-11-06 DIAGNOSIS — Z20.822: ICD-10-CM

## 2024-11-06 DIAGNOSIS — D64.9: ICD-10-CM

## 2024-11-06 DIAGNOSIS — I12.9: ICD-10-CM

## 2024-11-06 DIAGNOSIS — G93.41: ICD-10-CM

## 2024-11-06 DIAGNOSIS — Z79.899: ICD-10-CM

## 2024-11-06 DIAGNOSIS — H40.9: ICD-10-CM

## 2024-11-06 DIAGNOSIS — N17.9: ICD-10-CM

## 2024-11-06 DIAGNOSIS — Z87.891: ICD-10-CM

## 2024-11-06 DIAGNOSIS — E11.22: ICD-10-CM

## 2024-11-06 LAB
ALBUMIN SERPL BCP-MCNC: 3.4 G/DL (ref 3.4–5)
ALP SERPL-CCNC: 77 U/L (ref 46–116)
ALT SERPL W P-5'-P-CCNC: 16 U/L (ref 12–78)
AMPHETAMINES UR QL: NEGATIVE
APAP SERPL-MCNC: <10 UG/ML (ref 10–30)
APPEARANCE UR: CLEAR
AST SERPL W P-5'-P-CCNC: 9 U/L (ref 15–37)
BARBITURATES UR QL SCN: NEGATIVE
BASOPHILS # BLD AUTO: 0.1 K/UL (ref 0–0.2)
BASOPHILS NFR BLD AUTO: 1.5 % (ref 0–2)
BENZODIAZ UR QL SCN: NEGATIVE
BILIRUB DIRECT SERPL-MCNC: 0.1 MG/DL (ref 0–0.2)
BILIRUB SERPL-MCNC: 0.3 MG/DL (ref 0.2–1)
BILIRUB UR QL STRIP: NEGATIVE
BUN SERPL-MCNC: 13 MG/DL (ref 7–18)
CALCIUM SERPL-MCNC: 9.2 MG/DL (ref 8.5–10.1)
CANNABINOIDS UR QL SCN: NEGATIVE
CHLORIDE SERPL-SCNC: 106 MMOL/L (ref 98–107)
CO2 SERPL-SCNC: 31 MMOL/L (ref 21–32)
COCAINE UR QL SCN: NEGATIVE
COLOR UR: YELLOW
CREAT SERPL-MCNC: 1.1 MG/DL (ref 0.6–1.3)
EOSINOPHIL # BLD AUTO: 0.2 K/UL (ref 0–0.7)
EOSINOPHIL NFR BLD AUTO: 2.8 % (ref 0–6)
ERYTHROCYTE [DISTWIDTH] IN BLOOD BY AUTOMATED COUNT: 14.2 % (ref 11.5–15)
ETHANOL SERPL-MCNC: < 3 MG/DL (ref 0–10)
GLUCOSE SERPL-MCNC: 95 MG/DL (ref 74–106)
GLUCOSE UR STRIP-MCNC: NEGATIVE MG/DL
HCT VFR BLD AUTO: 38 % (ref 39–51)
HGB BLD-MCNC: 12.7 G/DL (ref 13.5–17.5)
HGB UR QL STRIP: NEGATIVE ERY/UL
KETONES UR STRIP-MCNC: NEGATIVE MG/DL
LEUKOCYTE ESTERASE UR QL STRIP: NEGATIVE
LYMPHOCYTES NFR BLD AUTO: 1.6 K/UL (ref 0.8–4.8)
LYMPHOCYTES NFR BLD AUTO: 25.7 % (ref 20–44)
MCH RBC QN AUTO: 28 PG (ref 26–33)
MCHC RBC AUTO-ENTMCNC: 33 G/DL (ref 31–36)
MCV RBC AUTO: 83 FL (ref 80–96)
MONOCYTES NFR BLD AUTO: 0.6 K/UL (ref 0.1–1.3)
MONOCYTES NFR BLD AUTO: 9.8 % (ref 2–12)
NEUTROPHILS # BLD AUTO: 3.7 K/UL (ref 1.8–8.9)
NEUTROPHILS NFR BLD AUTO: 60.2 % (ref 43–81)
NITRITE UR QL STRIP: NEGATIVE
OPIATES UR QL SCN: NEGATIVE
PCP UR QL SCN: NEGATIVE
PH UR STRIP: 7 [PH] (ref 5–8)
PLATELET # BLD AUTO: 264 K/UL (ref 150–450)
POTASSIUM SERPL-SCNC: 4 MMOL/L (ref 3.5–5.1)
PROT SERPL-MCNC: 7.5 G/DL (ref 6.4–8.2)
PROT UR QL STRIP: NEGATIVE MG/DL
RBC # BLD AUTO: 4.58 MIL/UL (ref 4.5–6)
SALICYLATES SERPL-MCNC: 1.4 MG/DL (ref 2.8–20)
SODIUM SERPL-SCNC: 142 MMOL/L (ref 136–145)
SP GR UR STRIP: 1.01 (ref 1–1.03)
UROBILINOGEN UR STRIP-MCNC: 0.2 EU/DL
WBC NRBC COR # BLD AUTO: 6.2 K/UL (ref 4.3–11)

## 2024-11-06 PROCEDURE — G0378 HOSPITAL OBSERVATION PER HR: HCPCS

## 2024-11-06 PROCEDURE — G0480 DRUG TEST DEF 1-7 CLASSES: HCPCS

## 2024-11-06 RX ADMIN — TEMAZEPAM PRN MG: 7.5 CAPSULE ORAL at 20:32

## 2024-11-06 RX ADMIN — Medication SCH EACH: at 22:19

## 2024-11-06 RX ADMIN — Medication SCH MG: at 18:40

## 2024-11-06 RX ADMIN — ASPIRIN 81 MG SCH MG: 81 TABLET ORAL at 18:40

## 2024-11-06 RX ADMIN — OLANZAPINE SCH MG: 10 TABLET ORAL at 22:11

## 2024-11-06 RX ADMIN — OLANZAPINE ONE MG: 10 INJECTION, POWDER, FOR SOLUTION INTRAMUSCULAR at 13:16

## 2024-11-06 RX ADMIN — INSULIN HUMAN PRN UNITS: 100 INJECTION, SOLUTION PARENTERAL at 22:21

## 2024-11-06 RX ADMIN — LORAZEPAM ONE MG: 2 INJECTION INTRAMUSCULAR; INTRAVENOUS at 14:33

## 2024-11-07 ENCOUNTER — HOSPITAL ENCOUNTER (INPATIENT)
Dept: HOSPITAL 54 - GPS | Age: 74
LOS: 14 days | Discharge: NURSING, REHAB OR LONG-TERM CARE | DRG: 885 | End: 2024-11-21
Attending: INTERNAL MEDICINE | Admitting: PSYCHIATRY & NEUROLOGY
Payer: MEDICARE

## 2024-11-07 VITALS — DIASTOLIC BLOOD PRESSURE: 81 MMHG | OXYGEN SATURATION: 99 % | TEMPERATURE: 98.6 F | SYSTOLIC BLOOD PRESSURE: 137 MMHG

## 2024-11-07 VITALS — DIASTOLIC BLOOD PRESSURE: 68 MMHG | OXYGEN SATURATION: 99 % | TEMPERATURE: 98.4 F | SYSTOLIC BLOOD PRESSURE: 148 MMHG

## 2024-11-07 VITALS — DIASTOLIC BLOOD PRESSURE: 70 MMHG | SYSTOLIC BLOOD PRESSURE: 128 MMHG | TEMPERATURE: 98 F | OXYGEN SATURATION: 98 %

## 2024-11-07 VITALS — TEMPERATURE: 98.8 F | SYSTOLIC BLOOD PRESSURE: 167 MMHG | OXYGEN SATURATION: 99 % | DIASTOLIC BLOOD PRESSURE: 88 MMHG

## 2024-11-07 VITALS — TEMPERATURE: 98.2 F | OXYGEN SATURATION: 99 % | SYSTOLIC BLOOD PRESSURE: 198 MMHG | DIASTOLIC BLOOD PRESSURE: 102 MMHG

## 2024-11-07 VITALS — TEMPERATURE: 98.6 F | DIASTOLIC BLOOD PRESSURE: 97 MMHG | SYSTOLIC BLOOD PRESSURE: 145 MMHG

## 2024-11-07 VITALS — HEIGHT: 69 IN | WEIGHT: 141 LBS | BODY MASS INDEX: 20.88 KG/M2

## 2024-11-07 DIAGNOSIS — I12.9: ICD-10-CM

## 2024-11-07 DIAGNOSIS — M19.90: ICD-10-CM

## 2024-11-07 DIAGNOSIS — N17.9: ICD-10-CM

## 2024-11-07 DIAGNOSIS — D64.9: ICD-10-CM

## 2024-11-07 DIAGNOSIS — F20.9: Primary | ICD-10-CM

## 2024-11-07 DIAGNOSIS — E88.09: ICD-10-CM

## 2024-11-07 DIAGNOSIS — F03.90: ICD-10-CM

## 2024-11-07 DIAGNOSIS — Z87.891: ICD-10-CM

## 2024-11-07 DIAGNOSIS — Z20.822: ICD-10-CM

## 2024-11-07 DIAGNOSIS — F19.91: ICD-10-CM

## 2024-11-07 DIAGNOSIS — J45.909: ICD-10-CM

## 2024-11-07 DIAGNOSIS — N18.9: ICD-10-CM

## 2024-11-07 DIAGNOSIS — H54.8: ICD-10-CM

## 2024-11-07 DIAGNOSIS — E11.22: ICD-10-CM

## 2024-11-07 DIAGNOSIS — E44.0: ICD-10-CM

## 2024-11-07 LAB
BASOPHILS # BLD AUTO: 0.1 K/UL (ref 0–0.2)
BASOPHILS NFR BLD AUTO: 0.6 % (ref 0–2)
BUN SERPL-MCNC: 10 MG/DL (ref 7–18)
CALCIUM SERPL-MCNC: 9.2 MG/DL (ref 8.5–10.1)
CHLORIDE SERPL-SCNC: 107 MMOL/L (ref 98–107)
CO2 SERPL-SCNC: 24 MMOL/L (ref 21–32)
CREAT SERPL-MCNC: 1 MG/DL (ref 0.6–1.3)
EOSINOPHIL # BLD AUTO: 0 K/UL (ref 0–0.7)
EOSINOPHIL NFR BLD AUTO: 0.2 % (ref 0–6)
ERYTHROCYTE [DISTWIDTH] IN BLOOD BY AUTOMATED COUNT: 14.3 % (ref 11.5–15)
GLUCOSE SERPL-MCNC: 101 MG/DL (ref 74–106)
HCT VFR BLD AUTO: 36 % (ref 39–51)
HGB BLD-MCNC: 11.8 G/DL (ref 13.5–17.5)
LYMPHOCYTES NFR BLD AUTO: 1.1 K/UL (ref 0.8–4.8)
LYMPHOCYTES NFR BLD AUTO: 10.5 % (ref 20–44)
MAGNESIUM SERPL-MCNC: 1.9 MG/DL (ref 1.8–2.4)
MCH RBC QN AUTO: 28 PG (ref 26–33)
MCHC RBC AUTO-ENTMCNC: 33 G/DL (ref 31–36)
MCV RBC AUTO: 84 FL (ref 80–96)
MONOCYTES NFR BLD AUTO: 0.8 K/UL (ref 0.1–1.3)
MONOCYTES NFR BLD AUTO: 8.1 % (ref 2–12)
NEUTROPHILS # BLD AUTO: 8.1 K/UL (ref 1.8–8.9)
NEUTROPHILS NFR BLD AUTO: 80.6 % (ref 43–81)
PHOSPHATE SERPL-MCNC: 2.4 MG/DL (ref 2.5–4.9)
PLATELET # BLD AUTO: 235 K/UL (ref 150–450)
POTASSIUM SERPL-SCNC: 3.5 MMOL/L (ref 3.5–5.1)
RBC # BLD AUTO: 4.27 MIL/UL (ref 4.5–6)
SODIUM SERPL-SCNC: 142 MMOL/L (ref 136–145)
TSH SERPL DL<=0.005 MIU/L-ACNC: 0.15 UIU/ML (ref 0.36–3.74)
WBC NRBC COR # BLD AUTO: 10 K/UL (ref 4.3–11)

## 2024-11-07 RX ADMIN — Medication ONE EACH: at 21:33

## 2024-11-07 RX ADMIN — HALOPERIDOL SCH MG: 5 TABLET ORAL at 09:16

## 2024-11-07 RX ADMIN — SODIUM PHOSPHATE, DIBASIC, ANHYDROUS, POTASSIUM PHOSPHATE, MONOBASIC, AND SODIUM PHOSPHATE, MONOBASIC, MONOHYDRATE ONE MG: 852; 155; 130 TABLET, COATED ORAL at 17:07

## 2024-11-07 RX ADMIN — PANTOPRAZOLE SODIUM SCH MG: 40 TABLET, DELAYED RELEASE ORAL at 06:32

## 2024-11-07 RX ADMIN — BENZTROPINE MESYLATE SCH MG: 1 TABLET ORAL at 09:16

## 2024-11-07 RX ADMIN — OLANZAPINE SCH MG: 2.5 TABLET ORAL at 09:16

## 2024-11-08 VITALS — DIASTOLIC BLOOD PRESSURE: 85 MMHG | OXYGEN SATURATION: 98 % | SYSTOLIC BLOOD PRESSURE: 133 MMHG | TEMPERATURE: 98 F

## 2024-11-08 VITALS — TEMPERATURE: 97.8 F | DIASTOLIC BLOOD PRESSURE: 91 MMHG | OXYGEN SATURATION: 98 % | SYSTOLIC BLOOD PRESSURE: 137 MMHG

## 2024-11-08 VITALS — OXYGEN SATURATION: 98 % | SYSTOLIC BLOOD PRESSURE: 105 MMHG | DIASTOLIC BLOOD PRESSURE: 64 MMHG | TEMPERATURE: 97.8 F

## 2024-11-08 LAB
APPEARANCE UR: CLEAR
BACTERIA UR CULT: NO
BILIRUB UR QL STRIP: NEGATIVE
COLOR UR: YELLOW
DEPRECATED SQUAMOUS URNS QL MICRO: (no result) /HPF
GLUCOSE UR STRIP-MCNC: NEGATIVE MG/DL
HGB UR QL STRIP: NEGATIVE ERY/UL
KETONES UR STRIP-MCNC: NEGATIVE MG/DL
LEUKOCYTE ESTERASE UR QL STRIP: NEGATIVE
NITRITE UR QL STRIP: NEGATIVE
PH UR STRIP: 5.5 [PH] (ref 5–8)
PROT UR QL STRIP: (no result) MG/DL
RBC #/AREA URNS HPF: (no result) /HPF (ref 0–2)
SP GR UR STRIP: 1.03 (ref 1–1.03)
UROBILINOGEN UR STRIP-MCNC: 0.2 EU/DL
WBC #/AREA URNS HPF: (no result) /HPF (ref 0–3)

## 2024-11-08 RX ADMIN — ASPIRIN 81 MG SCH MG: 81 TABLET ORAL at 17:22

## 2024-11-08 RX ADMIN — DORZOLAMIDE HYDROCHLORIDE AND TIMOLOL MALEATE SCH DROP: 20; 5 SOLUTION OPHTHALMIC at 16:51

## 2024-11-08 RX ADMIN — OLANZAPINE SCH MG: 2.5 TABLET ORAL at 14:17

## 2024-11-08 RX ADMIN — INSULIN HUMAN PRN UNITS: 100 INJECTION, SOLUTION PARENTERAL at 08:55

## 2024-11-08 RX ADMIN — HALOPERIDOL SCH MG: 5 TABLET ORAL at 14:17

## 2024-11-08 RX ADMIN — BENZTROPINE MESYLATE SCH MG: 1 TABLET ORAL at 21:05

## 2024-11-08 RX ADMIN — Medication SCH EACH: at 06:58

## 2024-11-08 RX ADMIN — CLONIDINE HYDROCHLORIDE SCH MG: 0.1 TABLET ORAL at 16:49

## 2024-11-08 RX ADMIN — OLANZAPINE SCH MG: 10 TABLET ORAL at 21:20

## 2024-11-08 RX ADMIN — Medication SCH MG: at 17:22

## 2024-11-08 RX ADMIN — Medication SCH ML: at 08:57

## 2024-11-09 VITALS — TEMPERATURE: 97.7 F | DIASTOLIC BLOOD PRESSURE: 70 MMHG | SYSTOLIC BLOOD PRESSURE: 103 MMHG | OXYGEN SATURATION: 98 %

## 2024-11-09 VITALS — TEMPERATURE: 98.1 F | SYSTOLIC BLOOD PRESSURE: 126 MMHG | DIASTOLIC BLOOD PRESSURE: 78 MMHG | OXYGEN SATURATION: 97 %

## 2024-11-09 VITALS — OXYGEN SATURATION: 96 % | DIASTOLIC BLOOD PRESSURE: 77 MMHG | SYSTOLIC BLOOD PRESSURE: 111 MMHG | TEMPERATURE: 98.6 F

## 2024-11-09 RX ADMIN — PANTOPRAZOLE SODIUM SCH MG: 40 TABLET, DELAYED RELEASE ORAL at 09:46

## 2024-11-09 RX ADMIN — NIFEDIPINE SCH MG: 60 TABLET, EXTENDED RELEASE ORAL at 09:00

## 2024-11-10 VITALS — OXYGEN SATURATION: 99 % | TEMPERATURE: 98.1 F | DIASTOLIC BLOOD PRESSURE: 78 MMHG | SYSTOLIC BLOOD PRESSURE: 116 MMHG

## 2024-11-10 VITALS — DIASTOLIC BLOOD PRESSURE: 75 MMHG | SYSTOLIC BLOOD PRESSURE: 106 MMHG | TEMPERATURE: 98 F | OXYGEN SATURATION: 99 %

## 2024-11-10 VITALS — TEMPERATURE: 98 F | OXYGEN SATURATION: 97 % | SYSTOLIC BLOOD PRESSURE: 115 MMHG | DIASTOLIC BLOOD PRESSURE: 57 MMHG

## 2024-11-10 RX ADMIN — TEMAZEPAM PRN MG: 7.5 CAPSULE ORAL at 22:32

## 2024-11-11 VITALS — DIASTOLIC BLOOD PRESSURE: 64 MMHG | SYSTOLIC BLOOD PRESSURE: 116 MMHG | OXYGEN SATURATION: 98 % | TEMPERATURE: 98.8 F

## 2024-11-11 VITALS — OXYGEN SATURATION: 97 % | SYSTOLIC BLOOD PRESSURE: 124 MMHG | DIASTOLIC BLOOD PRESSURE: 71 MMHG | TEMPERATURE: 98.7 F

## 2024-11-11 VITALS — SYSTOLIC BLOOD PRESSURE: 113 MMHG | TEMPERATURE: 98.6 F | OXYGEN SATURATION: 97 % | DIASTOLIC BLOOD PRESSURE: 68 MMHG

## 2024-11-11 RX ADMIN — ACETAMINOPHEN PRN MG: 325 TABLET ORAL at 17:09

## 2024-11-12 VITALS — OXYGEN SATURATION: 100 % | TEMPERATURE: 97.9 F | DIASTOLIC BLOOD PRESSURE: 67 MMHG | SYSTOLIC BLOOD PRESSURE: 115 MMHG

## 2024-11-12 VITALS — TEMPERATURE: 97.8 F | OXYGEN SATURATION: 98 % | SYSTOLIC BLOOD PRESSURE: 104 MMHG | DIASTOLIC BLOOD PRESSURE: 57 MMHG

## 2024-11-12 VITALS — TEMPERATURE: 98.8 F | SYSTOLIC BLOOD PRESSURE: 116 MMHG | DIASTOLIC BLOOD PRESSURE: 68 MMHG | OXYGEN SATURATION: 96 %

## 2024-11-13 VITALS — TEMPERATURE: 97.9 F | SYSTOLIC BLOOD PRESSURE: 136 MMHG | OXYGEN SATURATION: 96 % | DIASTOLIC BLOOD PRESSURE: 87 MMHG

## 2024-11-14 VITALS — OXYGEN SATURATION: 99 % | SYSTOLIC BLOOD PRESSURE: 109 MMHG | TEMPERATURE: 98.6 F | DIASTOLIC BLOOD PRESSURE: 61 MMHG

## 2024-11-14 VITALS — SYSTOLIC BLOOD PRESSURE: 116 MMHG | OXYGEN SATURATION: 98 % | DIASTOLIC BLOOD PRESSURE: 63 MMHG | TEMPERATURE: 98.4 F

## 2024-11-14 VITALS — TEMPERATURE: 97.7 F | DIASTOLIC BLOOD PRESSURE: 66 MMHG | OXYGEN SATURATION: 100 % | SYSTOLIC BLOOD PRESSURE: 120 MMHG

## 2024-11-15 VITALS — SYSTOLIC BLOOD PRESSURE: 167 MMHG | DIASTOLIC BLOOD PRESSURE: 90 MMHG | TEMPERATURE: 97.6 F | OXYGEN SATURATION: 96 %

## 2024-11-15 VITALS — OXYGEN SATURATION: 98 % | TEMPERATURE: 98 F | SYSTOLIC BLOOD PRESSURE: 144 MMHG | DIASTOLIC BLOOD PRESSURE: 66 MMHG

## 2024-11-15 VITALS — DIASTOLIC BLOOD PRESSURE: 76 MMHG | TEMPERATURE: 97.6 F | SYSTOLIC BLOOD PRESSURE: 128 MMHG | OXYGEN SATURATION: 93 %

## 2024-11-16 VITALS — TEMPERATURE: 97.6 F | SYSTOLIC BLOOD PRESSURE: 108 MMHG | DIASTOLIC BLOOD PRESSURE: 66 MMHG | OXYGEN SATURATION: 98 %

## 2024-11-16 VITALS — OXYGEN SATURATION: 98 % | TEMPERATURE: 98 F | DIASTOLIC BLOOD PRESSURE: 67 MMHG | SYSTOLIC BLOOD PRESSURE: 131 MMHG

## 2024-11-16 VITALS — OXYGEN SATURATION: 95 % | TEMPERATURE: 97.9 F | DIASTOLIC BLOOD PRESSURE: 67 MMHG | SYSTOLIC BLOOD PRESSURE: 113 MMHG

## 2024-11-17 VITALS — SYSTOLIC BLOOD PRESSURE: 102 MMHG | OXYGEN SATURATION: 100 % | DIASTOLIC BLOOD PRESSURE: 60 MMHG | TEMPERATURE: 97.8 F

## 2024-11-17 VITALS — SYSTOLIC BLOOD PRESSURE: 130 MMHG | OXYGEN SATURATION: 99 % | DIASTOLIC BLOOD PRESSURE: 68 MMHG | TEMPERATURE: 97.8 F

## 2024-11-17 VITALS — TEMPERATURE: 98.6 F | DIASTOLIC BLOOD PRESSURE: 64 MMHG | SYSTOLIC BLOOD PRESSURE: 133 MMHG | OXYGEN SATURATION: 100 %

## 2024-11-17 LAB
ALBUMIN SERPL BCP-MCNC: 2.7 G/DL (ref 3.4–5)
ALP SERPL-CCNC: 59 U/L (ref 46–116)
ALT SERPL W P-5'-P-CCNC: 20 U/L (ref 12–78)
AST SERPL W P-5'-P-CCNC: 13 U/L (ref 15–37)
BASOPHILS # BLD AUTO: 0.1 K/UL (ref 0–0.2)
BASOPHILS NFR BLD AUTO: 1 % (ref 0–2)
BILIRUB SERPL-MCNC: 0.3 MG/DL (ref 0.2–1)
BUN SERPL-MCNC: 17 MG/DL (ref 7–18)
CALCIUM SERPL-MCNC: 8.8 MG/DL (ref 8.5–10.1)
CHLORIDE SERPL-SCNC: 110 MMOL/L (ref 98–107)
CO2 SERPL-SCNC: 27 MMOL/L (ref 21–32)
CREAT SERPL-MCNC: 1.1 MG/DL (ref 0.6–1.3)
EOSINOPHIL # BLD AUTO: 0.5 K/UL (ref 0–0.7)
EOSINOPHIL NFR BLD AUTO: 6.9 % (ref 0–6)
ERYTHROCYTE [DISTWIDTH] IN BLOOD BY AUTOMATED COUNT: 14.4 % (ref 11.5–15)
GLUCOSE SERPL-MCNC: 88 MG/DL (ref 74–106)
HCT VFR BLD AUTO: 36 % (ref 39–51)
HGB BLD-MCNC: 11.6 G/DL (ref 13.5–17.5)
LYMPHOCYTES NFR BLD AUTO: 2.3 K/UL (ref 0.8–4.8)
LYMPHOCYTES NFR BLD AUTO: 34.8 % (ref 20–44)
MAGNESIUM SERPL-MCNC: 1.9 MG/DL (ref 1.8–2.4)
MCH RBC QN AUTO: 27 PG (ref 26–33)
MCHC RBC AUTO-ENTMCNC: 33 G/DL (ref 31–36)
MCV RBC AUTO: 83 FL (ref 80–96)
MONOCYTES NFR BLD AUTO: 0.6 K/UL (ref 0.1–1.3)
MONOCYTES NFR BLD AUTO: 9.7 % (ref 2–12)
NEUTROPHILS # BLD AUTO: 3.2 K/UL (ref 1.8–8.9)
NEUTROPHILS NFR BLD AUTO: 47.6 % (ref 43–81)
PHOSPHATE SERPL-MCNC: 3.3 MG/DL (ref 2.5–4.9)
PLATELET # BLD AUTO: 245 K/UL (ref 150–450)
POTASSIUM SERPL-SCNC: 3.5 MMOL/L (ref 3.5–5.1)
PROT SERPL-MCNC: 6.1 G/DL (ref 6.4–8.2)
RBC # BLD AUTO: 4.27 MIL/UL (ref 4.5–6)
SODIUM SERPL-SCNC: 143 MMOL/L (ref 136–145)
WBC NRBC COR # BLD AUTO: 6.7 K/UL (ref 4.3–11)

## 2024-11-18 VITALS — SYSTOLIC BLOOD PRESSURE: 132 MMHG | DIASTOLIC BLOOD PRESSURE: 78 MMHG | OXYGEN SATURATION: 98 % | TEMPERATURE: 98.3 F

## 2024-11-18 VITALS — SYSTOLIC BLOOD PRESSURE: 120 MMHG | OXYGEN SATURATION: 99 % | TEMPERATURE: 98.6 F | DIASTOLIC BLOOD PRESSURE: 68 MMHG

## 2024-11-18 VITALS — TEMPERATURE: 98.6 F | OXYGEN SATURATION: 98 % | DIASTOLIC BLOOD PRESSURE: 64 MMHG | SYSTOLIC BLOOD PRESSURE: 131 MMHG

## 2024-11-19 VITALS — DIASTOLIC BLOOD PRESSURE: 66 MMHG | SYSTOLIC BLOOD PRESSURE: 134 MMHG | TEMPERATURE: 97.9 F | OXYGEN SATURATION: 100 %

## 2024-11-19 VITALS — DIASTOLIC BLOOD PRESSURE: 85 MMHG | OXYGEN SATURATION: 100 % | SYSTOLIC BLOOD PRESSURE: 149 MMHG | TEMPERATURE: 97.9 F

## 2024-11-20 VITALS — SYSTOLIC BLOOD PRESSURE: 126 MMHG | TEMPERATURE: 97.2 F | OXYGEN SATURATION: 97 % | DIASTOLIC BLOOD PRESSURE: 61 MMHG

## 2024-11-20 VITALS — DIASTOLIC BLOOD PRESSURE: 61 MMHG | SYSTOLIC BLOOD PRESSURE: 126 MMHG

## 2024-11-20 VITALS — OXYGEN SATURATION: 95 % | SYSTOLIC BLOOD PRESSURE: 135 MMHG | TEMPERATURE: 98.4 F | DIASTOLIC BLOOD PRESSURE: 64 MMHG

## 2024-11-20 VITALS — DIASTOLIC BLOOD PRESSURE: 77 MMHG | OXYGEN SATURATION: 100 % | SYSTOLIC BLOOD PRESSURE: 119 MMHG | TEMPERATURE: 96.9 F

## 2024-11-20 VITALS — TEMPERATURE: 98.4 F | OXYGEN SATURATION: 96 % | DIASTOLIC BLOOD PRESSURE: 48 MMHG | SYSTOLIC BLOOD PRESSURE: 94 MMHG

## 2024-11-20 RX ADMIN — OLANZAPINE SCH MG: 2.5 TABLET ORAL at 12:30

## 2024-11-20 RX ADMIN — OLANZAPINE SCH MG: 5 TABLET, FILM COATED ORAL at 17:13

## 2024-11-21 VITALS — TEMPERATURE: 98 F | SYSTOLIC BLOOD PRESSURE: 132 MMHG | DIASTOLIC BLOOD PRESSURE: 67 MMHG | OXYGEN SATURATION: 96 %

## 2024-11-21 VITALS — DIASTOLIC BLOOD PRESSURE: 72 MMHG | SYSTOLIC BLOOD PRESSURE: 121 MMHG

## 2024-11-21 RX ADMIN — LORAZEPAM PRN MG: 0.5 TABLET ORAL at 02:16

## 2025-03-03 ENCOUNTER — HOSPITAL ENCOUNTER (INPATIENT)
Dept: HOSPITAL 54 - ER | Age: 75
LOS: 15 days | Discharge: NURSING, REHAB OR LONG-TERM CARE | DRG: 885 | End: 2025-03-18
Attending: INTERNAL MEDICINE | Admitting: PSYCHIATRY & NEUROLOGY
Payer: MEDICARE

## 2025-03-03 VITALS — WEIGHT: 141 LBS | HEIGHT: 73 IN | BODY MASS INDEX: 18.69 KG/M2

## 2025-03-03 DIAGNOSIS — F19.11: ICD-10-CM

## 2025-03-03 DIAGNOSIS — M62.81: ICD-10-CM

## 2025-03-03 DIAGNOSIS — Z20.822: ICD-10-CM

## 2025-03-03 DIAGNOSIS — F41.9: ICD-10-CM

## 2025-03-03 DIAGNOSIS — D63.8: ICD-10-CM

## 2025-03-03 DIAGNOSIS — J45.909: ICD-10-CM

## 2025-03-03 DIAGNOSIS — E44.0: ICD-10-CM

## 2025-03-03 DIAGNOSIS — R64: ICD-10-CM

## 2025-03-03 DIAGNOSIS — Z73.6: ICD-10-CM

## 2025-03-03 DIAGNOSIS — Z79.82: ICD-10-CM

## 2025-03-03 DIAGNOSIS — I10: ICD-10-CM

## 2025-03-03 DIAGNOSIS — F25.0: Primary | ICD-10-CM

## 2025-03-03 DIAGNOSIS — E11.9: ICD-10-CM

## 2025-03-03 DIAGNOSIS — F03.90: ICD-10-CM

## 2025-03-03 DIAGNOSIS — H54.8: ICD-10-CM

## 2025-03-03 DIAGNOSIS — M19.90: ICD-10-CM

## 2025-03-03 DIAGNOSIS — F29: ICD-10-CM

## 2025-03-03 DIAGNOSIS — F32.A: ICD-10-CM

## 2025-03-03 DIAGNOSIS — Z87.891: ICD-10-CM

## 2025-03-03 DIAGNOSIS — F15.10: ICD-10-CM

## 2025-03-03 LAB
AMMONIA PLAS-SCNC: 7 UMOL/L (ref 11–32)
APPEARANCE UR: CLEAR
BASOPHILS # BLD AUTO: 0.1 K/UL (ref 0–0.2)
BASOPHILS NFR BLD AUTO: 1 % (ref 0–2)
BILIRUB UR QL STRIP: NEGATIVE
COLOR UR: YELLOW
EOSINOPHIL # BLD AUTO: 0.1 K/UL (ref 0–0.7)
EOSINOPHIL NFR BLD AUTO: 1.7 % (ref 0–6)
ERYTHROCYTE [DISTWIDTH] IN BLOOD BY AUTOMATED COUNT: 14.6 % (ref 11.5–15)
GLUCOSE UR STRIP-MCNC: NEGATIVE MG/DL
HCT VFR BLD AUTO: 40 % (ref 39–51)
HGB BLD-MCNC: 13 G/DL (ref 13.5–17.5)
HGB UR QL STRIP: NEGATIVE ERY/UL
KETONES UR STRIP-MCNC: NEGATIVE MG/DL
LEUKOCYTE ESTERASE UR QL STRIP: NEGATIVE
LYMPHOCYTES NFR BLD AUTO: 1.8 K/UL (ref 0.8–4.8)
LYMPHOCYTES NFR BLD AUTO: 22.5 % (ref 20–44)
MCH RBC QN AUTO: 27 PG (ref 26–33)
MCHC RBC AUTO-ENTMCNC: 33 G/DL (ref 31–36)
MCV RBC AUTO: 83 FL (ref 80–96)
MONOCYTES NFR BLD AUTO: 0.7 K/UL (ref 0.1–1.3)
MONOCYTES NFR BLD AUTO: 8.6 % (ref 2–12)
NEUTROPHILS # BLD AUTO: 5.4 K/UL (ref 1.8–8.9)
NEUTROPHILS NFR BLD AUTO: 66.2 % (ref 43–81)
NITRITE UR QL STRIP: NEGATIVE
PH UR STRIP: 7.5 [PH] (ref 5–8)
PLATELET # BLD AUTO: 248 K/UL (ref 150–450)
PROT UR QL STRIP: NEGATIVE MG/DL
RBC # BLD AUTO: 4.79 MIL/UL (ref 4.5–6)
SP GR UR STRIP: 1.01 (ref 1–1.03)
UROBILINOGEN UR STRIP-MCNC: 0.2 EU/DL
WBC NRBC COR # BLD AUTO: 8.1 K/UL (ref 4.3–11)

## 2025-03-03 PROCEDURE — G0480 DRUG TEST DEF 1-7 CLASSES: HCPCS

## 2025-03-04 VITALS — DIASTOLIC BLOOD PRESSURE: 128 MMHG | TEMPERATURE: 98 F | SYSTOLIC BLOOD PRESSURE: 147 MMHG | OXYGEN SATURATION: 95 %

## 2025-03-04 VITALS — TEMPERATURE: 98.2 F | OXYGEN SATURATION: 97 % | SYSTOLIC BLOOD PRESSURE: 108 MMHG | DIASTOLIC BLOOD PRESSURE: 69 MMHG

## 2025-03-04 LAB
ALBUMIN SERPL BCP-MCNC: 3.7 G/DL (ref 3.4–5)
ALP SERPL-CCNC: 86 U/L (ref 46–116)
ALT SERPL W P-5'-P-CCNC: 23 U/L (ref 12–78)
AMPHETAMINES UR QL: NEGATIVE
APAP SERPL-MCNC: <10 UG/ML (ref 10–30)
AST SERPL W P-5'-P-CCNC: 17 U/L (ref 15–37)
BARBITURATES UR QL SCN: NEGATIVE
BENZODIAZ UR QL SCN: NEGATIVE
BILIRUB SERPL-MCNC: 0.2 MG/DL (ref 0.2–1)
BUN SERPL-MCNC: 15 MG/DL (ref 7–18)
CALCIUM SERPL-MCNC: 9.6 MG/DL (ref 8.5–10.1)
CANNABINOIDS UR QL SCN: NEGATIVE
CHLORIDE SERPL-SCNC: 107 MMOL/L (ref 98–107)
CO2 SERPL-SCNC: 33 MMOL/L (ref 21–32)
COCAINE UR QL SCN: NEGATIVE
CREAT SERPL-MCNC: 1.2 MG/DL (ref 0.6–1.3)
ETHANOL SERPL-MCNC: < 3 MG/DL (ref 0–10)
GLUCOSE SERPL-MCNC: 95 MG/DL (ref 74–106)
OPIATES UR QL SCN: NEGATIVE
PCP UR QL SCN: NEGATIVE
POTASSIUM SERPL-SCNC: 3.5 MMOL/L (ref 3.5–5.1)
PROT SERPL-MCNC: 7.6 G/DL (ref 6.4–8.2)
SALICYLATES SERPL-MCNC: 1.3 MG/DL (ref 2.8–20)
SODIUM SERPL-SCNC: 143 MMOL/L (ref 136–145)

## 2025-03-04 RX ADMIN — BENZTROPINE MESYLATE SCH MG: 1 TABLET ORAL at 16:46

## 2025-03-04 RX ADMIN — Medication SCH MG: at 17:08

## 2025-03-04 RX ADMIN — ASPIRIN 81 MG SCH MG: 81 TABLET ORAL at 17:08

## 2025-03-04 RX ADMIN — CLONIDINE HYDROCHLORIDE SCH MG: 0.1 TABLET ORAL at 16:46

## 2025-03-04 RX ADMIN — BRIMONIDINE TARTRATE SCH ML: 2 SOLUTION/ DROPS OPHTHALMIC at 16:46

## 2025-03-04 RX ADMIN — ACETAMINOPHEN PRN MG: 325 TABLET ORAL at 23:17

## 2025-03-04 RX ADMIN — HALOPERIDOL SCH MG: 5 TABLET ORAL at 12:35

## 2025-03-04 RX ADMIN — LORAZEPAM STA MG: 2 INJECTION INTRAMUSCULAR; INTRAVENOUS at 06:23

## 2025-03-04 RX ADMIN — OLANZAPINE SCH MG: 2.5 TABLET ORAL at 16:46

## 2025-03-04 RX ADMIN — Medication ONE EACH: at 09:03

## 2025-03-04 RX ADMIN — OLANZAPINE SCH MG: 10 TABLET ORAL at 21:25

## 2025-03-05 VITALS — DIASTOLIC BLOOD PRESSURE: 124 MMHG | OXYGEN SATURATION: 98 % | SYSTOLIC BLOOD PRESSURE: 164 MMHG | TEMPERATURE: 98.6 F

## 2025-03-05 VITALS — SYSTOLIC BLOOD PRESSURE: 167 MMHG | OXYGEN SATURATION: 98 % | DIASTOLIC BLOOD PRESSURE: 86 MMHG | TEMPERATURE: 97.7 F

## 2025-03-05 VITALS — TEMPERATURE: 98.7 F | SYSTOLIC BLOOD PRESSURE: 181 MMHG | DIASTOLIC BLOOD PRESSURE: 106 MMHG | OXYGEN SATURATION: 98 %

## 2025-03-05 RX ADMIN — PANTOPRAZOLE SODIUM SCH MG: 40 TABLET, DELAYED RELEASE ORAL at 07:58

## 2025-03-05 RX ADMIN — NIFEDIPINE SCH MG: 60 TABLET, EXTENDED RELEASE ORAL at 09:00

## 2025-03-06 VITALS — DIASTOLIC BLOOD PRESSURE: 90 MMHG | SYSTOLIC BLOOD PRESSURE: 129 MMHG | OXYGEN SATURATION: 98 % | TEMPERATURE: 98 F

## 2025-03-06 VITALS — DIASTOLIC BLOOD PRESSURE: 72 MMHG | OXYGEN SATURATION: 95 % | SYSTOLIC BLOOD PRESSURE: 131 MMHG | TEMPERATURE: 97.6 F

## 2025-03-06 VITALS — DIASTOLIC BLOOD PRESSURE: 86 MMHG | TEMPERATURE: 99 F | SYSTOLIC BLOOD PRESSURE: 160 MMHG | OXYGEN SATURATION: 96 %

## 2025-03-07 VITALS — OXYGEN SATURATION: 100 % | TEMPERATURE: 97.7 F | DIASTOLIC BLOOD PRESSURE: 88 MMHG | SYSTOLIC BLOOD PRESSURE: 129 MMHG

## 2025-03-07 VITALS — SYSTOLIC BLOOD PRESSURE: 151 MMHG | DIASTOLIC BLOOD PRESSURE: 89 MMHG | OXYGEN SATURATION: 100 % | TEMPERATURE: 97.8 F

## 2025-03-07 VITALS — OXYGEN SATURATION: 99 % | TEMPERATURE: 97.9 F | SYSTOLIC BLOOD PRESSURE: 148 MMHG | DIASTOLIC BLOOD PRESSURE: 78 MMHG

## 2025-03-07 RX ADMIN — ACETAMINOPHEN PRN MG: 325 TABLET ORAL at 16:54

## 2025-03-08 VITALS — SYSTOLIC BLOOD PRESSURE: 149 MMHG | TEMPERATURE: 99 F | OXYGEN SATURATION: 97 % | DIASTOLIC BLOOD PRESSURE: 82 MMHG

## 2025-03-08 VITALS — DIASTOLIC BLOOD PRESSURE: 95 MMHG | TEMPERATURE: 97.9 F | OXYGEN SATURATION: 95 % | SYSTOLIC BLOOD PRESSURE: 158 MMHG

## 2025-03-08 VITALS — SYSTOLIC BLOOD PRESSURE: 157 MMHG | DIASTOLIC BLOOD PRESSURE: 93 MMHG | OXYGEN SATURATION: 99 % | TEMPERATURE: 98 F

## 2025-03-08 RX ADMIN — LORAZEPAM PRN MG: 0.5 TABLET ORAL at 21:19

## 2025-03-08 RX ADMIN — OLANZAPINE SCH MG: 5 TABLET, FILM COATED ORAL at 18:00

## 2025-03-09 VITALS — OXYGEN SATURATION: 98 % | TEMPERATURE: 98.1 F | SYSTOLIC BLOOD PRESSURE: 133 MMHG | DIASTOLIC BLOOD PRESSURE: 76 MMHG

## 2025-03-09 VITALS — SYSTOLIC BLOOD PRESSURE: 128 MMHG | OXYGEN SATURATION: 98 % | TEMPERATURE: 98.1 F | DIASTOLIC BLOOD PRESSURE: 84 MMHG

## 2025-03-09 VITALS — DIASTOLIC BLOOD PRESSURE: 56 MMHG | SYSTOLIC BLOOD PRESSURE: 100 MMHG | OXYGEN SATURATION: 96 % | TEMPERATURE: 97.8 F

## 2025-03-09 RX ADMIN — Medication SCH ML: at 09:18

## 2025-03-10 VITALS — SYSTOLIC BLOOD PRESSURE: 141 MMHG | DIASTOLIC BLOOD PRESSURE: 82 MMHG | OXYGEN SATURATION: 100 % | TEMPERATURE: 98.7 F

## 2025-03-10 VITALS — TEMPERATURE: 98.7 F | DIASTOLIC BLOOD PRESSURE: 82 MMHG | OXYGEN SATURATION: 98 % | SYSTOLIC BLOOD PRESSURE: 150 MMHG

## 2025-03-10 VITALS — OXYGEN SATURATION: 99 % | SYSTOLIC BLOOD PRESSURE: 119 MMHG | DIASTOLIC BLOOD PRESSURE: 64 MMHG | TEMPERATURE: 98.6 F

## 2025-03-11 VITALS — DIASTOLIC BLOOD PRESSURE: 57 MMHG | SYSTOLIC BLOOD PRESSURE: 129 MMHG | TEMPERATURE: 98.3 F | OXYGEN SATURATION: 97 %

## 2025-03-11 VITALS — SYSTOLIC BLOOD PRESSURE: 115 MMHG | OXYGEN SATURATION: 100 % | DIASTOLIC BLOOD PRESSURE: 48 MMHG | TEMPERATURE: 98.1 F

## 2025-03-11 VITALS — TEMPERATURE: 98.2 F | DIASTOLIC BLOOD PRESSURE: 66 MMHG | OXYGEN SATURATION: 100 % | SYSTOLIC BLOOD PRESSURE: 159 MMHG

## 2025-03-11 LAB
TSH SERPL DL<=0.005 MIU/L-ACNC: 0.12 UIU/ML (ref 0.36–3.74)
VIT B12 SERPL-MCNC: 641 PG/ML (ref 193–986)

## 2025-03-11 RX ADMIN — MEMANTINE HYDROCHLORIDE SCH MG: 5 TABLET ORAL at 21:08

## 2025-03-12 VITALS — DIASTOLIC BLOOD PRESSURE: 84 MMHG | SYSTOLIC BLOOD PRESSURE: 153 MMHG | TEMPERATURE: 97.7 F | OXYGEN SATURATION: 98 %

## 2025-03-12 VITALS — OXYGEN SATURATION: 98 % | DIASTOLIC BLOOD PRESSURE: 67 MMHG | SYSTOLIC BLOOD PRESSURE: 122 MMHG | TEMPERATURE: 98.4 F

## 2025-03-12 VITALS — DIASTOLIC BLOOD PRESSURE: 91 MMHG | TEMPERATURE: 98.7 F | SYSTOLIC BLOOD PRESSURE: 146 MMHG | OXYGEN SATURATION: 99 %

## 2025-03-12 RX ADMIN — MEMANTINE HYDROCHLORIDE SCH MG: 5 TABLET ORAL at 20:52

## 2025-03-13 VITALS — TEMPERATURE: 97.2 F | SYSTOLIC BLOOD PRESSURE: 174 MMHG | DIASTOLIC BLOOD PRESSURE: 87 MMHG | OXYGEN SATURATION: 97 %

## 2025-03-13 VITALS — SYSTOLIC BLOOD PRESSURE: 155 MMHG | OXYGEN SATURATION: 99 % | DIASTOLIC BLOOD PRESSURE: 74 MMHG | TEMPERATURE: 98.5 F

## 2025-03-13 VITALS — SYSTOLIC BLOOD PRESSURE: 125 MMHG | DIASTOLIC BLOOD PRESSURE: 73 MMHG

## 2025-03-13 VITALS — TEMPERATURE: 98.2 F | DIASTOLIC BLOOD PRESSURE: 73 MMHG | OXYGEN SATURATION: 100 % | SYSTOLIC BLOOD PRESSURE: 144 MMHG

## 2025-03-13 LAB — FOLATE SERPL-MCNC: 8.9 NG/ML (ref 3–?)

## 2025-03-14 VITALS — OXYGEN SATURATION: 98 % | SYSTOLIC BLOOD PRESSURE: 111 MMHG | DIASTOLIC BLOOD PRESSURE: 52 MMHG | TEMPERATURE: 97.9 F

## 2025-03-14 VITALS — SYSTOLIC BLOOD PRESSURE: 92 MMHG | OXYGEN SATURATION: 95 % | DIASTOLIC BLOOD PRESSURE: 59 MMHG | TEMPERATURE: 98.1 F

## 2025-03-14 VITALS — DIASTOLIC BLOOD PRESSURE: 72 MMHG | SYSTOLIC BLOOD PRESSURE: 145 MMHG | OXYGEN SATURATION: 96 % | TEMPERATURE: 97.8 F

## 2025-03-15 VITALS — OXYGEN SATURATION: 98 % | SYSTOLIC BLOOD PRESSURE: 150 MMHG | DIASTOLIC BLOOD PRESSURE: 93 MMHG | TEMPERATURE: 99.1 F

## 2025-03-15 VITALS — DIASTOLIC BLOOD PRESSURE: 84 MMHG | OXYGEN SATURATION: 98 % | TEMPERATURE: 97.9 F | SYSTOLIC BLOOD PRESSURE: 142 MMHG

## 2025-03-15 VITALS — SYSTOLIC BLOOD PRESSURE: 107 MMHG | TEMPERATURE: 97.6 F | DIASTOLIC BLOOD PRESSURE: 73 MMHG | OXYGEN SATURATION: 98 %

## 2025-03-16 VITALS — DIASTOLIC BLOOD PRESSURE: 54 MMHG | TEMPERATURE: 98.1 F | OXYGEN SATURATION: 100 % | SYSTOLIC BLOOD PRESSURE: 95 MMHG

## 2025-03-16 VITALS — DIASTOLIC BLOOD PRESSURE: 82 MMHG | SYSTOLIC BLOOD PRESSURE: 154 MMHG | OXYGEN SATURATION: 97 % | TEMPERATURE: 97.7 F

## 2025-03-16 VITALS — SYSTOLIC BLOOD PRESSURE: 102 MMHG | DIASTOLIC BLOOD PRESSURE: 64 MMHG | TEMPERATURE: 98.8 F | OXYGEN SATURATION: 100 %

## 2025-03-16 LAB — VIT B1 SERPL-SCNC: 74.6 NMOL/L (ref 66.5–200)

## 2025-03-17 VITALS — TEMPERATURE: 97.8 F | OXYGEN SATURATION: 96 % | SYSTOLIC BLOOD PRESSURE: 122 MMHG | DIASTOLIC BLOOD PRESSURE: 91 MMHG

## 2025-03-17 VITALS — DIASTOLIC BLOOD PRESSURE: 74 MMHG | TEMPERATURE: 97.8 F | OXYGEN SATURATION: 100 % | SYSTOLIC BLOOD PRESSURE: 117 MMHG

## 2025-03-17 VITALS — OXYGEN SATURATION: 98 % | TEMPERATURE: 97.8 F | DIASTOLIC BLOOD PRESSURE: 85 MMHG | SYSTOLIC BLOOD PRESSURE: 138 MMHG

## 2025-03-17 LAB — METHYLMALONATE SERPL-SCNC: 275 NMOL/L (ref 0–378)

## 2025-03-18 VITALS — SYSTOLIC BLOOD PRESSURE: 139 MMHG | DIASTOLIC BLOOD PRESSURE: 73 MMHG | TEMPERATURE: 97.7 F | OXYGEN SATURATION: 99 %

## 2025-03-18 VITALS — TEMPERATURE: 97 F | OXYGEN SATURATION: 99 % | SYSTOLIC BLOOD PRESSURE: 140 MMHG | DIASTOLIC BLOOD PRESSURE: 90 MMHG

## 2025-03-18 VITALS — SYSTOLIC BLOOD PRESSURE: 139 MMHG | DIASTOLIC BLOOD PRESSURE: 88 MMHG | TEMPERATURE: 97 F | OXYGEN SATURATION: 99 %
